# Patient Record
Sex: FEMALE | Employment: UNEMPLOYED | URBAN - METROPOLITAN AREA
[De-identification: names, ages, dates, MRNs, and addresses within clinical notes are randomized per-mention and may not be internally consistent; named-entity substitution may affect disease eponyms.]

---

## 2024-07-08 PROBLEM — Z98.891 H/O: C-SECTION: Status: ACTIVE | Noted: 2024-07-08

## 2024-07-20 ENCOUNTER — HOSPITAL ENCOUNTER (EMERGENCY)
Age: 34
Discharge: HOME | End: 2024-07-20
Attending: EMERGENCY MEDICINE
Payer: MEDICAID

## 2024-07-20 VITALS
HEIGHT: 64 IN | DIASTOLIC BLOOD PRESSURE: 80 MMHG | SYSTOLIC BLOOD PRESSURE: 115 MMHG | HEART RATE: 79 BPM | RESPIRATION RATE: 18 BRPM | WEIGHT: 228 LBS | OXYGEN SATURATION: 98 % | TEMPERATURE: 98.8 F | BODY MASS INDEX: 38.93 KG/M2

## 2024-07-20 DIAGNOSIS — O46.90 VAGINAL BLEEDING IN PREGNANCY: Primary | ICD-10-CM

## 2024-07-20 LAB
BACTERIA URNS QL MICRO: ABNORMAL
BILIRUB UR QL STRIP: NEGATIVE
CASTS #/AREA URNS LPF: ABNORMAL /LPF
CLARITY UR: ABNORMAL
COLOR UR: YELLOW
EPI CELLS #/AREA URNS HPF: ABNORMAL /HPF
GLUCOSE UR STRIP-MCNC: NEGATIVE MG/DL
HGB UR QL STRIP.AUTO: ABNORMAL
KETONES UR STRIP-MCNC: NEGATIVE MG/DL
LEUKOCYTE ESTERASE UR QL STRIP: ABNORMAL
NITRITE UR QL STRIP: NEGATIVE
PH UR STRIP: 8 [PH] (ref 5–8)
PROT UR STRIP-MCNC: NEGATIVE MG/DL
RBC #/AREA URNS HPF: ABNORMAL /HPF
SP GR UR STRIP: 1.02 (ref 1–1.03)
UROBILINOGEN UR STRIP-ACNC: NORMAL EU/DL (ref 0–1)
WBC #/AREA URNS HPF: ABNORMAL /HPF

## 2024-07-20 PROCEDURE — 81001 URINALYSIS AUTO W/SCOPE: CPT

## 2024-07-20 PROCEDURE — 99283 EMERGENCY DEPT VISIT LOW MDM: CPT

## 2024-07-20 ASSESSMENT — ENCOUNTER SYMPTOMS
SORE THROAT: 0
FACIAL SWELLING: 0
RHINORRHEA: 0
CONSTIPATION: 0
CHEST TIGHTNESS: 0
BLOOD IN STOOL: 0
SINUS PRESSURE: 0
EYE REDNESS: 0
COUGH: 0
BACK PAIN: 0
VOMITING: 0
TROUBLE SWALLOWING: 0
ABDOMINAL PAIN: 0
EYE PAIN: 0
SHORTNESS OF BREATH: 0
DIARRHEA: 0
NAUSEA: 0
EYE DISCHARGE: 0
WHEEZING: 0
COLOR CHANGE: 0

## 2024-07-20 ASSESSMENT — LIFESTYLE VARIABLES
HOW OFTEN DO YOU HAVE A DRINK CONTAINING ALCOHOL: NEVER
HOW MANY STANDARD DRINKS CONTAINING ALCOHOL DO YOU HAVE ON A TYPICAL DAY: PATIENT DOES NOT DRINK

## 2024-07-20 NOTE — ED PROVIDER NOTES
eMERGENCY dEPARTMENT eNCOUnter      Pt Name: Danni Cole  MRN: [id number]  Birthdate 1990  Date of evaluation: 7/20/24      CHIEF COMPLAINT       Chief Complaint   Patient presents with    Vaginal Bleeding         HISTORY OF PRESENT ILLNESS    Danni Cole is a 33 y.o. female who presents complaining of spotting.  Patient states she is about 13 weeks pregnant based on her last menstrual period.  Patient is done a first look at the pregnancy center but has not seen her actual doctor until this upcoming week.  Patient states she is been a little bit more active this past week because before that she has been down quite a bit with a lot of nausea.  Patient states that she has no abdominal pain no recent intercourse.  Patient states she is not having any dysuria or hematuria.  Patient states that today she woke up and noticed spotting was on her underwear and then when she wiped there was still some that lightened up.  Patient has not started to feel the baby move yet.  Patient has had 1 prior pregnancy with no complications and a live birth.  Patient has not otherwise been ill.      REVIEW OF SYSTEMS       Review of Systems   Constitutional:  Negative for activity change, appetite change, chills, diaphoresis and fever.   HENT:  Negative for congestion, ear pain, facial swelling, nosebleeds, rhinorrhea, sinus pressure, sore throat and trouble swallowing.    Eyes:  Negative for pain, discharge and redness.   Respiratory:  Negative for cough, chest tightness, shortness of breath and wheezing.    Cardiovascular:  Negative for chest pain, palpitations and leg swelling.   Gastrointestinal:  Negative for abdominal pain, blood in stool, constipation, diarrhea, nausea and vomiting.   Genitourinary:  Positive for vaginal bleeding. Negative for difficulty urinating, dysuria, flank pain, frequency, genital sores and hematuria.   Musculoskeletal:  Negative for arthralgias, back pain, gait problem, joint swelling, myalgias

## 2024-07-20 NOTE — ED NOTES
Mode of arrival (squad #, walk in, police, etc) : walk in        Chief complaint(s): spotting        Arrival Note (brief scenario, treatment PTA, etc).: Pt reports to the ED with the complaint of spotting. Pt states she has been mostly in bedrest because of how severe her nausea has been. She states the last couple days she has been up around more and trying to clean and be more active. Pt woke up today with some brown spotting that has not stopped but has not worsened. Pt denies any cramping and has not had intercourse in weeks due to her nausea. Pt is drinking lots of water and able to keep food down just feels nausea after. Pt stated she had a lot of issues with BV during her last pregnancy so she has abstained from sex and been drinking more water.         C= \"Have you ever felt that you should Cut down on your drinking?\"  No  A= \"Have people Annoyed you by criticizing your drinking?\"  No  G= \"Have you ever felt bad or Guilty about your drinking?\"  No  E= \"Have you ever had a drink as an Eye-opener first thing in the morning to steady your nerves or to help a hangover?\"  No      Deferred []      Reason for deferring: N/A    *If yes to two or more: probable alcohol abuse.*

## 2024-07-20 NOTE — ED NOTES
Pt offered testing for BV if that is what she is concerned about, pt denies and states she will follow up with the OBGYN on Tuesday during her appointment. Pt just wanted to make sure baby was okay and bedside ultrasound showed a intrauterine pregnancy and baby did have a heartbeat at this time per Dr. Clancy.

## 2024-07-23 ENCOUNTER — HOSPITAL ENCOUNTER (OUTPATIENT)
Age: 34
Discharge: HOME | End: 2024-07-23
Payer: MEDICAID

## 2024-07-23 ENCOUNTER — INITIAL PRENATAL (OUTPATIENT)
Dept: OBGYN CLINIC | Age: 34
End: 2024-07-23
Payer: MEDICAID

## 2024-07-23 DIAGNOSIS — Z36.9 1ST TRIMESTER SCREENING: ICD-10-CM

## 2024-07-23 DIAGNOSIS — Z3A.13 13 WEEKS GESTATION OF PREGNANCY: Primary | ICD-10-CM

## 2024-07-23 DIAGNOSIS — Z34.90 EARLY STAGE OF PREGNANCY: Primary | ICD-10-CM

## 2024-07-23 DIAGNOSIS — Z3A.13 13 WEEKS GESTATION OF PREGNANCY: ICD-10-CM

## 2024-07-23 DIAGNOSIS — Z34.90 EARLY STAGE OF PREGNANCY: ICD-10-CM

## 2024-07-23 LAB
ABO + RH BLD: NORMAL
BACTERIA URNS QL MICRO: ABNORMAL
BASOPHILS # BLD: 0 K/UL (ref 0–0.2)
BASOPHILS NFR BLD: 0 % (ref 0–2)
BILIRUB UR QL STRIP: NEGATIVE
BLOOD BANK COMMENT: NORMAL
BLOOD GROUP ANTIBODIES SERPL: NEGATIVE
CASTS #/AREA URNS LPF: ABNORMAL /LPF
CLARITY UR: ABNORMAL
COLOR UR: YELLOW
EOSINOPHIL # BLD: 0.1 K/UL (ref 0–0.4)
EOSINOPHILS RELATIVE PERCENT: 1 % (ref 0–4)
EPI CELLS #/AREA URNS HPF: ABNORMAL /HPF
ERYTHROCYTE [DISTWIDTH] IN BLOOD BY AUTOMATED COUNT: 13.7 % (ref 11.5–14.9)
GLUCOSE SERPL-MCNC: 87 MG/DL (ref 70–99)
GLUCOSE UR STRIP-MCNC: NEGATIVE MG/DL
HBV SURFACE AG SERPL QL IA: NONREACTIVE
HCT VFR BLD AUTO: 38.6 % (ref 36–46)
HCV AB SERPL QL IA: NONREACTIVE
HGB BLD-MCNC: 12.7 G/DL (ref 12–16)
HGB UR QL STRIP.AUTO: ABNORMAL
HIV 1+2 AB+HIV1 P24 AG SERPL QL IA: NONREACTIVE
KETONES UR STRIP-MCNC: NEGATIVE MG/DL
LEUKOCYTE ESTERASE UR QL STRIP: ABNORMAL
LYMPHOCYTES NFR BLD: 1.3 K/UL (ref 1–4.8)
LYMPHOCYTES RELATIVE PERCENT: 16 % (ref 24–44)
MCH RBC QN AUTO: 30.9 PG (ref 26–34)
MCHC RBC AUTO-ENTMCNC: 33 G/DL (ref 31–37)
MCV RBC AUTO: 93.5 FL (ref 80–100)
MONOCYTES NFR BLD: 0.4 K/UL (ref 0.1–1.3)
MONOCYTES NFR BLD: 5 % (ref 1–7)
NEUTROPHILS NFR BLD: 78 % (ref 36–66)
NEUTS SEG NFR BLD: 6.5 K/UL (ref 1.3–9.1)
NITRITE UR QL STRIP: NEGATIVE
PH UR STRIP: 5.5 [PH] (ref 5–8)
PLATELET # BLD AUTO: 222 K/UL (ref 150–450)
PMV BLD AUTO: 8 FL (ref 6–12)
PROT UR STRIP-MCNC: NEGATIVE MG/DL
RBC # BLD AUTO: 4.13 M/UL (ref 4–5.2)
RBC #/AREA URNS HPF: ABNORMAL /HPF
RUBV IGG SERPL QL IA: 98.9 IU/ML
SP GR UR STRIP: 1.02 (ref 1–1.03)
T PALLIDUM AB SER QL IA: NONREACTIVE
TSH SERPL DL<=0.05 MIU/L-ACNC: 1.34 UIU/ML (ref 0.3–5)
UROBILINOGEN UR STRIP-ACNC: NORMAL EU/DL (ref 0–1)
WBC #/AREA URNS HPF: ABNORMAL /HPF
WBC OTHER # BLD: 8.3 K/UL (ref 3.5–11)

## 2024-07-23 PROCEDURE — 87340 HEPATITIS B SURFACE AG IA: CPT

## 2024-07-23 PROCEDURE — 86901 BLOOD TYPING SEROLOGIC RH(D): CPT

## 2024-07-23 PROCEDURE — H1000 PRENATAL CARE ATRISK ASSESSM: HCPCS | Performed by: NURSE PRACTITIONER

## 2024-07-23 PROCEDURE — 86762 RUBELLA ANTIBODY: CPT

## 2024-07-23 PROCEDURE — 86803 HEPATITIS C AB TEST: CPT

## 2024-07-23 PROCEDURE — 86850 RBC ANTIBODY SCREEN: CPT

## 2024-07-23 PROCEDURE — 87389 HIV-1 AG W/HIV-1&-2 AB AG IA: CPT

## 2024-07-23 PROCEDURE — 87086 URINE CULTURE/COLONY COUNT: CPT

## 2024-07-23 PROCEDURE — 82947 ASSAY GLUCOSE BLOOD QUANT: CPT

## 2024-07-23 PROCEDURE — 81220 CFTR GENE COM VARIANTS: CPT

## 2024-07-23 PROCEDURE — 36415 COLL VENOUS BLD VENIPUNCTURE: CPT

## 2024-07-23 PROCEDURE — 81001 URINALYSIS AUTO W/SCOPE: CPT

## 2024-07-23 PROCEDURE — 86780 TREPONEMA PALLIDUM: CPT

## 2024-07-23 PROCEDURE — 84443 ASSAY THYROID STIM HORMONE: CPT

## 2024-07-23 PROCEDURE — 86900 BLOOD TYPING SEROLOGIC ABO: CPT

## 2024-07-23 PROCEDURE — NBSRV NON-BILLABLE SERVICE: Performed by: NURSE PRACTITIONER

## 2024-07-23 PROCEDURE — 85025 COMPLETE CBC W/AUTO DIFF WBC: CPT

## 2024-07-23 NOTE — PROGRESS NOTES
Patient presents to office for OB intake, nurse visit only. Intake completed following ultrasound in office to confirm pregnancy dating/viability. Based on ultrasound, patient is currently 13w3d  gestation, Estimated Date of Delivery: 1/25/25. Patient presents to intake FOB. This was an unplanned pregnancy. Patient currently taking has a current medication list which includes the following prescription(s): diphenhydramine hcl (sleep), doxylamine succinate (sleep), and pyridoxine.. Patient's medical, surgical, obstetrical and family history reviewed. See HER for details. Patient prenatal lab work given to patient at this visit as well as OB education material. New OB consent forms signed. Patient accepted first trimester screening. Cystic Fibrosis screening ordered . Referral placed to [facility] for first trimester screen. Patient scheduled for follow up OB appointment with Karolina Miguel CNP 8/13/24. Patient instructed to complete lab work prior to follow up OB appointment.

## 2024-07-24 ENCOUNTER — TELEPHONE (OUTPATIENT)
Dept: OBGYN CLINIC | Age: 34
End: 2024-07-24

## 2024-07-24 LAB
MICROORGANISM SPEC CULT: NORMAL
SPECIMEN DESCRIPTION: NORMAL

## 2024-07-24 RX ORDER — CEPHALEXIN 500 MG/1
500 CAPSULE ORAL 4 TIMES DAILY
Qty: 28 CAPSULE | Refills: 0 | Status: SHIPPED | OUTPATIENT
Start: 2024-07-24 | End: 2024-07-31

## 2024-07-24 NOTE — TELEPHONE ENCOUNTER
----- Message from QUE Dickerson - CNP sent at 7/24/2024  7:55 AM EDT -----  Trace of hgb in urine in early pregnancy  Keflex 500mg PO QID x 7 days

## 2024-07-24 NOTE — TELEPHONE ENCOUNTER
Patients  returned call to the office and was notified of results and recommendations, script for keflex to be sent to [facility].

## 2024-07-28 LAB
CFTR ALLELE 1 BLD/T QL: NEGATIVE
CFTR ALLELE 2 BLD/T QL: NEGATIVE
CFTR MUT ANL BLD/T: NORMAL
CFTR MUT ANL BLD/T: NORMAL

## 2024-08-13 ENCOUNTER — HOSPITAL ENCOUNTER (OUTPATIENT)
Age: 34
Discharge: HOME | End: 2024-08-13
Payer: MEDICAID

## 2024-08-13 ENCOUNTER — ROUTINE PRENATAL (OUTPATIENT)
Dept: OBGYN CLINIC | Age: 34
End: 2024-08-13
Payer: MEDICAID

## 2024-08-13 ENCOUNTER — HOSPITAL ENCOUNTER (OUTPATIENT)
Age: 34
Setting detail: SPECIMEN
Discharge: HOME | End: 2024-08-13

## 2024-08-13 VITALS
WEIGHT: 240 LBS | BODY MASS INDEX: 41.2 KG/M2 | HEART RATE: 89 BPM | SYSTOLIC BLOOD PRESSURE: 114 MMHG | DIASTOLIC BLOOD PRESSURE: 78 MMHG

## 2024-08-13 DIAGNOSIS — O09.92 SUPERVISION OF HIGH RISK PREGNANCY IN SECOND TRIMESTER: Primary | ICD-10-CM

## 2024-08-13 DIAGNOSIS — Z98.891 H/O: C-SECTION: ICD-10-CM

## 2024-08-13 DIAGNOSIS — Z3A.16 16 WEEKS GESTATION OF PREGNANCY: ICD-10-CM

## 2024-08-13 DIAGNOSIS — Z3A.16 16 WEEKS GESTATION OF PREGNANCY: Primary | ICD-10-CM

## 2024-08-13 PROCEDURE — 86778 TOXOPLASMA ANTIBODY IGM: CPT

## 2024-08-13 PROCEDURE — 81511 FTL CGEN ABNOR FOUR ANAL: CPT

## 2024-08-13 PROCEDURE — 36415 COLL VENOUS BLD VENIPUNCTURE: CPT

## 2024-08-13 PROCEDURE — 99214 OFFICE O/P EST MOD 30 MIN: CPT | Performed by: NURSE PRACTITIONER

## 2024-08-13 PROCEDURE — 86777 TOXOPLASMA ANTIBODY: CPT

## 2024-08-13 NOTE — PROGRESS NOTES
Danni Cole  2024    YOB: 1990   Patient's last menstrual period was 2024 (approximate).  16w3d        Primary Care Physician: No primary care provider on file.        CC: Initial Prenatal Visit    Subjective:     Danni Cole is a 34 y.o. female     is being seen today for her first obstetrical visit.  This is a planned pregnancy. She is at 16w3d  Her obstetrical history is significant for h/o .      Relationship with FOB: significant other, living together. Patient does intend to breast feed. Pregnancy history fully reviewed.        Objective:   Blood pressure 114/78, pulse 89, weight 108.9 kg (240 lb), last menstrual period 2024.    OB History    Para Term  AB Living   2 1 1 0 0 1   SAB IAB Ectopic Molar Multiple Live Births   0 0 0 0 0 1      # Outcome Date GA Lbr Dada/2nd Weight Sex Type Anes PTL Lv   2 Current            1 Term 2018 40w0d  3.487 kg (7 lb 11 oz) M CS-LTranv   RORY      Birth Comments: [place]       Past Medical History:   Diagnosis Date    Asthma     Seasonal allergies      Past Surgical History:   Procedure Laterality Date     SECTION       SECTION  2018    NOSE SURGERY      x2    NOSE SURGERY        Social History     Socioeconomic History    Marital status:      Spouse name: Not on file    Number of children: Not on file    Years of education: Not on file    Highest education level: Not on file   Occupational History    Not on file   Tobacco Use    Smoking status: Never     Passive exposure: Never    Smokeless tobacco: Never   Vaping Use    Vaping status: Never Used   Substance and Sexual Activity    Alcohol use: Never    Drug use: Never    Sexual activity: Yes     Partners: Male   Other Topics Concern    Not on file   Social History Narrative    ** Merged History Encounter **          Social Determinants of Health     Financial Resource Strain: Not on file   Food

## 2024-08-14 LAB
CANDIDA SPECIES: NEGATIVE
GARDNERELLA VAGINALIS: NEGATIVE
SOURCE: NORMAL
T GONDII IGG SER-ACNC: 1.5 IU/ML
T GONDII IGM SER-ACNC: <0.1 INDEX (ref 0–0.9)
TRICHOMONAS: NEGATIVE

## 2024-08-15 LAB
HPV I/H RISK 4 DNA CVX QL NAA+PROBE: NOT DETECTED
HPV SAMPLE: NORMAL
HPV, INTERPRETATION: NORMAL
HPV16 DNA CVX QL NAA+PROBE: NOT DETECTED
HPV18 DNA CVX QL NAA+PROBE: NOT DETECTED
SPECIMEN DESCRIPTION: NORMAL

## 2024-08-16 LAB
C TRACH DNA SPEC QL PROBE+SIG AMP: NEGATIVE
MICROORGANISM SPEC CULT: NORMAL
N GONORRHOEA DNA SPEC QL PROBE+SIG AMP: NEGATIVE
SERVICE CMNT-IMP: NORMAL
SPECIMEN DESCRIPTION: NORMAL
SPECIMEN DESCRIPTION: NORMAL

## 2024-08-17 LAB
AFP INTERPRETATION: NORMAL
AFP MOM: 1.18
AFP QUAD INTERPRETATION: NORMAL
AFP SPECIMEN: NORMAL
AFP: 31 NG/ML
DATE OF BIRTH: NORMAL
DATING METHOD: NORMAL
DETERMINED BY: NORMAL
DIABETIC: NO
DIMERIC INHIBIN A: 139 PG/ML
DONOR EGG?: NO
DUE DATE: NORMAL
ESTIMATED DUE DATE: NORMAL
FAMILY HISTORY NTD: NO
GESTATIONAL AGE: NORMAL
HX OF HEREDITARY DISORDERS: NO
IN VITRO FERTILIZATION: NO
INHIBIN A MOM: 1.05
INSULIN REQ DIABETES: NO
LAST MENSTRUAL PERIOD: NORMAL
MATERNAL AGE AT EDD: 34.5 YR
MATERNAL WEIGHT: 240
MOM FOR HCG, 2ND TRIMESTER: 1.57
MONOCHORIONIC TWINS: NO
NUMBER OF FETUSES: NORMAL
PATIENT WEIGHT UNITS: NORMAL
PATIENT WEIGHT: NORMAL
PATIENT'S HCG, TRI 2: NORMAL IU/L
PREV TRISOMY PREG: NO
RACE (MATERNAL): NORMAL
RACE: NORMAL
REPEAT SPECIMEN?: NO
SMOKING: NO
SMOKING: NO
UE3 MOM: 0.96
UE3 VALUE: 1.27 NG/ML
VALPROIC/CARBAMAZEP: NO
ZZ NTE CLEAN UP: HISTORY: NO

## 2024-08-22 LAB — CYTOLOGY REPORT: NORMAL

## 2024-09-11 SDOH — ECONOMIC STABILITY: FOOD INSECURITY: WITHIN THE PAST 12 MONTHS, THE FOOD YOU BOUGHT JUST DIDN'T LAST AND YOU DIDN'T HAVE MONEY TO GET MORE.: PATIENT DECLINED

## 2024-09-11 SDOH — ECONOMIC STABILITY: TRANSPORTATION INSECURITY
IN THE PAST 12 MONTHS, HAS LACK OF TRANSPORTATION KEPT YOU FROM MEETINGS, WORK, OR FROM GETTING THINGS NEEDED FOR DAILY LIVING?: PATIENT DECLINED

## 2024-09-11 SDOH — ECONOMIC STABILITY: INCOME INSECURITY: HOW HARD IS IT FOR YOU TO PAY FOR THE VERY BASICS LIKE FOOD, HOUSING, MEDICAL CARE, AND HEATING?: PATIENT DECLINED

## 2024-09-11 SDOH — ECONOMIC STABILITY: FOOD INSECURITY: WITHIN THE PAST 12 MONTHS, YOU WORRIED THAT YOUR FOOD WOULD RUN OUT BEFORE YOU GOT MONEY TO BUY MORE.: PATIENT DECLINED

## 2024-09-12 ENCOUNTER — ROUTINE PRENATAL (OUTPATIENT)
Dept: OBGYN CLINIC | Age: 34
End: 2024-09-12
Payer: MEDICAID

## 2024-09-12 ENCOUNTER — HOSPITAL ENCOUNTER (OUTPATIENT)
Age: 34
Discharge: HOME | End: 2024-09-12
Payer: MEDICAID

## 2024-09-12 ENCOUNTER — ROUTINE PRENATAL (OUTPATIENT)
Dept: PERINATAL CARE | Age: 34
End: 2024-09-12

## 2024-09-12 VITALS
TEMPERATURE: 98.2 F | RESPIRATION RATE: 16 BRPM | HEART RATE: 90 BPM | SYSTOLIC BLOOD PRESSURE: 124 MMHG | BODY MASS INDEX: 43.36 KG/M2 | DIASTOLIC BLOOD PRESSURE: 78 MMHG | HEIGHT: 64 IN | WEIGHT: 254 LBS

## 2024-09-12 VITALS
SYSTOLIC BLOOD PRESSURE: 104 MMHG | HEART RATE: 97 BPM | WEIGHT: 252 LBS | DIASTOLIC BLOOD PRESSURE: 60 MMHG | BODY MASS INDEX: 43.26 KG/M2

## 2024-09-12 DIAGNOSIS — O44.22 PLACENTA MARGINALIS IN SECOND TRIMESTER: ICD-10-CM

## 2024-09-12 DIAGNOSIS — O35.BXX0 FETAL VENTRICULAR SEPTAL DEFECT AFFECTING ANTEPARTUM CARE OF MOTHER, SINGLE OR UNSPECIFIED FETUS: Primary | ICD-10-CM

## 2024-09-12 DIAGNOSIS — O35.BXX0 FETAL CARDIAC ECHOGENIC FOCUS, ANTEPARTUM, SINGLE OR UNSPECIFIED FETUS: ICD-10-CM

## 2024-09-12 DIAGNOSIS — Z3A.20 20 WEEKS GESTATION OF PREGNANCY: ICD-10-CM

## 2024-09-12 DIAGNOSIS — O43.199 MARGINAL INSERTION OF UMBILICAL CORD AFFECTING MANAGEMENT OF MOTHER: ICD-10-CM

## 2024-09-12 DIAGNOSIS — O99.212 OBESITY AFFECTING PREGNANCY IN SECOND TRIMESTER, UNSPECIFIED OBESITY TYPE: ICD-10-CM

## 2024-09-12 DIAGNOSIS — O35.BXX0 FETAL VENTRICULAR SEPTAL DEFECT AFFECTING ANTEPARTUM CARE OF MOTHER, SINGLE OR UNSPECIFIED FETUS: ICD-10-CM

## 2024-09-12 DIAGNOSIS — Z36.86 ENCOUNTER FOR SCREENING FOR RISK OF PRE-TERM LABOR: ICD-10-CM

## 2024-09-12 DIAGNOSIS — Z98.891 H/O: C-SECTION: Primary | ICD-10-CM

## 2024-09-12 LAB
SEND OUT REPORT: NORMAL
TEST NAME: NORMAL

## 2024-09-12 PROCEDURE — 36415 COLL VENOUS BLD VENIPUNCTURE: CPT

## 2024-09-12 PROCEDURE — 99214 OFFICE O/P EST MOD 30 MIN: CPT | Performed by: OBSTETRICS & GYNECOLOGY

## 2024-09-16 PROBLEM — O28.3 ABNORMAL FETAL ULTRASOUND: Status: ACTIVE | Noted: 2024-09-16

## 2024-09-16 PROBLEM — O43.199 MARGINAL INSERTION OF UMBILICAL CORD AFFECTING MANAGEMENT OF MOTHER: Status: ACTIVE | Noted: 2024-09-16

## 2024-09-30 ENCOUNTER — TELEPHONE (OUTPATIENT)
Dept: PERINATAL CARE | Age: 34
End: 2024-09-30

## 2024-09-30 DIAGNOSIS — R89.9 ABNORMAL LABORATORY TEST RESULT: Primary | ICD-10-CM

## 2024-09-30 NOTE — TELEPHONE ENCOUNTER
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

## 2024-10-10 ENCOUNTER — ROUTINE PRENATAL (OUTPATIENT)
Dept: OBGYN CLINIC | Age: 34
End: 2024-10-10
Payer: MEDICAID

## 2024-10-10 VITALS
DIASTOLIC BLOOD PRESSURE: 68 MMHG | SYSTOLIC BLOOD PRESSURE: 110 MMHG | HEART RATE: 80 BPM | BODY MASS INDEX: 44.11 KG/M2 | WEIGHT: 257 LBS

## 2024-10-10 DIAGNOSIS — Z98.891 H/O: C-SECTION: ICD-10-CM

## 2024-10-10 DIAGNOSIS — O43.199 MARGINAL INSERTION OF UMBILICAL CORD AFFECTING MANAGEMENT OF MOTHER: ICD-10-CM

## 2024-10-10 DIAGNOSIS — O28.3 ABNORMAL FETAL ULTRASOUND: ICD-10-CM

## 2024-10-10 DIAGNOSIS — Z3A.24 24 WEEKS GESTATION OF PREGNANCY: ICD-10-CM

## 2024-10-10 DIAGNOSIS — O09.92 HIGH-RISK PREGNANCY IN SECOND TRIMESTER: Primary | ICD-10-CM

## 2024-10-10 PROCEDURE — 99213 OFFICE O/P EST LOW 20 MIN: CPT | Performed by: NURSE PRACTITIONER

## 2024-10-10 NOTE — PROGRESS NOTES
Danni Cole is a 34 y.o. female 24w5d        OB History    Para Term  AB Living   2 1 1 0 0 1   SAB IAB Ectopic Molar Multiple Live Births   0 0 0 0   1      # Outcome Date GA Lbr Dada/2nd Weight Sex Type Anes PTL Lv   2 Current            1 Term 2018 40w0d  3.487 kg (7 lb 11 oz) M CS-LTranv   RORY      Birth Comments: [place]       Vitals  BP: 110/68  Weight - Scale: 116.6 kg (257 lb)  Pulse: 80  Patient Position: Sitting    The patient was seen and evaluated. There was positive fetal movements. No contractions or leakage of fluid. Signs and symptoms of  labor as well as labor were reviewed.The Nuchal Translucency testing was reviewed and found to be LPC A single marker MSAFP was reviewed and found to be normal. The patients anatomy ultrasound has been completed and reviewed with patient. TOP  OH Reviewed. A 28 week lab panel was ordered. This includes a (HH, 1 hr GTT, U/A C&S). The patient is to complete this in the next two to four weeks.    The following was discussed:  A. Counseling on the incidents of birth defects in the general population being 2-4% and that ultrasound does not diagnose every form of congenital abnormality and has limitations .   B. The only way to evaluate the chromosomal makeup to near 100% certainty is with invasive testing such as chorionic villous sampling or amniocentesis.   C. The options for testing listed in (B) with detail and all risks/benefits and alternatives will be discussed in the high risk setting.   D. NIPT testing options will be discussed with the patient, taking a maternal blood sample and screening it for fetal cells then performing a genetic karyotype.  If this were to be positive for                    a trisomy then a confirmatory amniocentesis or [facility] for confirmation would be needed.    E. TOPSTOH guidelines will be reviewed with the patient.      The S/S of Pre-Eclampsia were reviewed with the patient in detail. She is to

## 2024-10-24 ENCOUNTER — ROUTINE PRENATAL (OUTPATIENT)
Dept: PERINATAL CARE | Age: 34
End: 2024-10-24
Payer: MEDICAID

## 2024-10-24 VITALS
SYSTOLIC BLOOD PRESSURE: 133 MMHG | WEIGHT: 266 LBS | BODY MASS INDEX: 45.41 KG/M2 | RESPIRATION RATE: 16 BRPM | HEIGHT: 64 IN | HEART RATE: 101 BPM | DIASTOLIC BLOOD PRESSURE: 82 MMHG | TEMPERATURE: 98 F

## 2024-10-24 DIAGNOSIS — O44.22 PLACENTA MARGINALIS IN SECOND TRIMESTER: ICD-10-CM

## 2024-10-24 DIAGNOSIS — Z36.4 ULTRASOUND FOR ANTENATAL SCREENING FOR FETAL GROWTH RESTRICTION: ICD-10-CM

## 2024-10-24 DIAGNOSIS — O35.BXX0 FETAL CARDIAC ECHOGENIC FOCUS, ANTEPARTUM, SINGLE OR UNSPECIFIED FETUS: ICD-10-CM

## 2024-10-24 DIAGNOSIS — Z3A.26 26 WEEKS GESTATION OF PREGNANCY: ICD-10-CM

## 2024-10-24 DIAGNOSIS — O35.BXX0 FETAL VENTRICULAR SEPTAL DEFECT AFFECTING ANTEPARTUM CARE OF MOTHER, SINGLE OR UNSPECIFIED FETUS: Primary | ICD-10-CM

## 2024-10-24 DIAGNOSIS — O99.212 OBESITY AFFECTING PREGNANCY IN SECOND TRIMESTER, UNSPECIFIED OBESITY TYPE: ICD-10-CM

## 2024-10-24 PROCEDURE — 76820 UMBILICAL ARTERY ECHO: CPT | Performed by: OBSTETRICS & GYNECOLOGY

## 2024-10-24 PROCEDURE — 76825 ECHO EXAM OF FETAL HEART: CPT | Performed by: OBSTETRICS & GYNECOLOGY

## 2024-10-24 PROCEDURE — 76816 OB US FOLLOW-UP PER FETUS: CPT | Performed by: OBSTETRICS & GYNECOLOGY

## 2024-10-24 PROCEDURE — 76827 ECHO EXAM OF FETAL HEART: CPT | Performed by: OBSTETRICS & GYNECOLOGY

## 2024-10-24 PROCEDURE — 93325 DOPPLER ECHO COLOR FLOW MAPG: CPT | Performed by: OBSTETRICS & GYNECOLOGY

## 2024-10-29 ENCOUNTER — HOSPITAL ENCOUNTER (OUTPATIENT)
Age: 34
Discharge: HOME | End: 2024-10-29
Payer: MEDICAID

## 2024-10-29 DIAGNOSIS — Z3A.24 24 WEEKS GESTATION OF PREGNANCY: ICD-10-CM

## 2024-10-29 DIAGNOSIS — O09.92 HIGH-RISK PREGNANCY IN SECOND TRIMESTER: ICD-10-CM

## 2024-10-29 LAB
BACTERIA URNS QL MICRO: ABNORMAL
BASOPHILS # BLD: 0.1 K/UL (ref 0–0.2)
BASOPHILS NFR BLD: 1 % (ref 0–2)
BILIRUB UR QL STRIP: NEGATIVE
CASTS #/AREA URNS LPF: ABNORMAL /LPF
CLARITY UR: ABNORMAL
COLOR UR: YELLOW
EOSINOPHIL # BLD: 0.1 K/UL (ref 0–0.4)
EOSINOPHILS RELATIVE PERCENT: 1 % (ref 0–4)
EPI CELLS #/AREA URNS HPF: ABNORMAL /HPF
ERYTHROCYTE [DISTWIDTH] IN BLOOD BY AUTOMATED COUNT: 13.4 % (ref 11.5–14.9)
GLUCOSE UR STRIP-MCNC: NEGATIVE MG/DL
HCT VFR BLD AUTO: 35.5 % (ref 36–46)
HGB BLD-MCNC: 12.4 G/DL (ref 12–16)
HGB UR QL STRIP.AUTO: NEGATIVE
KETONES UR STRIP-MCNC: NEGATIVE MG/DL
LEUKOCYTE ESTERASE UR QL STRIP: ABNORMAL
LYMPHOCYTES NFR BLD: 1.6 K/UL (ref 1–4.8)
LYMPHOCYTES RELATIVE PERCENT: 14 % (ref 24–44)
MCH RBC QN AUTO: 32.4 PG (ref 26–34)
MCHC RBC AUTO-ENTMCNC: 35 G/DL (ref 31–37)
MCV RBC AUTO: 92.4 FL (ref 80–100)
MONOCYTES NFR BLD: 0.6 K/UL (ref 0.1–1.3)
MONOCYTES NFR BLD: 5 % (ref 1–7)
NEUTROPHILS NFR BLD: 79 % (ref 36–66)
NEUTS SEG NFR BLD: 9.1 K/UL (ref 1.3–9.1)
NITRITE UR QL STRIP: NEGATIVE
PH UR STRIP: 6 [PH] (ref 5–8)
PLATELET # BLD AUTO: 241 K/UL (ref 150–450)
PMV BLD AUTO: 7.5 FL (ref 6–12)
PROT UR STRIP-MCNC: NEGATIVE MG/DL
RBC # BLD AUTO: 3.84 M/UL (ref 4–5.2)
RBC #/AREA URNS HPF: ABNORMAL /HPF
SP GR UR STRIP: 1.03 (ref 1–1.03)
UROBILINOGEN UR STRIP-ACNC: NORMAL EU/DL (ref 0–1)
WBC #/AREA URNS HPF: ABNORMAL /HPF
WBC OTHER # BLD: 11.4 K/UL (ref 3.5–11)

## 2024-10-29 PROCEDURE — 85025 COMPLETE CBC W/AUTO DIFF WBC: CPT

## 2024-10-29 PROCEDURE — 81001 URINALYSIS AUTO W/SCOPE: CPT

## 2024-10-29 PROCEDURE — 87086 URINE CULTURE/COLONY COUNT: CPT

## 2024-10-29 PROCEDURE — 36415 COLL VENOUS BLD VENIPUNCTURE: CPT

## 2024-10-30 LAB
MICROORGANISM SPEC CULT: NORMAL
SPECIMEN DESCRIPTION: NORMAL

## 2024-11-07 ENCOUNTER — ROUTINE PRENATAL (OUTPATIENT)
Dept: OBGYN CLINIC | Age: 34
End: 2024-11-07
Payer: MEDICAID

## 2024-11-07 VITALS
BODY MASS INDEX: 46.69 KG/M2 | WEIGHT: 272 LBS | HEART RATE: 80 BPM | DIASTOLIC BLOOD PRESSURE: 74 MMHG | SYSTOLIC BLOOD PRESSURE: 110 MMHG

## 2024-11-07 DIAGNOSIS — Z98.891 H/O: C-SECTION: ICD-10-CM

## 2024-11-07 DIAGNOSIS — Z3A.28 28 WEEKS GESTATION OF PREGNANCY: ICD-10-CM

## 2024-11-07 DIAGNOSIS — O43.199 MARGINAL INSERTION OF UMBILICAL CORD AFFECTING MANAGEMENT OF MOTHER: ICD-10-CM

## 2024-11-07 DIAGNOSIS — O28.3 ABNORMAL FETAL ULTRASOUND: ICD-10-CM

## 2024-11-07 DIAGNOSIS — O09.92 HIGH-RISK PREGNANCY IN SECOND TRIMESTER: Primary | ICD-10-CM

## 2024-11-07 PROCEDURE — 99213 OFFICE O/P EST LOW 20 MIN: CPT | Performed by: NURSE PRACTITIONER

## 2024-11-07 NOTE — PROGRESS NOTES
Absolute 10/29/2024 0.10  0.0 - 0.2 k/uL Final    Color, UA 10/29/2024 YELLOW  Yellow Final    Turbidity UA 10/29/2024 CLOUDY  Clear Final    Glucose, Ur 10/29/2024 NEGATIVE  NEGATIVE mg/dL Final    Bilirubin, Urine 10/29/2024 NEGATIVE  NEGATIVE Final    Ketones, Urine 10/29/2024 NEGATIVE  NEGATIVE mg/dL Final    Specific Gravity, UA 10/29/2024 1.030  1.000 - 1.030 Final    Urine Hgb 10/29/2024 NEGATIVE  NEGATIVE Final    pH, Urine 10/29/2024 6.0  5.0 - 8.0 Final    Protein, UA 10/29/2024 NEGATIVE  NEGATIVE mg/dL Final    Urobilinogen, Urine 10/29/2024 Normal  0.0 - 1.0 EU/dL Final    Nitrite, Urine 10/29/2024 NEGATIVE  NEGATIVE Final    Leukocyte Esterase, Urine 10/29/2024 SMALL (A)  NEGATIVE Final    WBC, UA 10/29/2024 10 TO 20 (A)  0 TO 5 /HPF Final    RBC, UA 10/29/2024 3 to 5 (A)  0 TO 2 /HPF Final    Casts UA 10/29/2024 0 TO 2 (A)  None /LPF Final    Epithelial Cells, UA 10/29/2024 6 TO 9  /HPF Final    Bacteria, UA 10/29/2024 MODERATE (A)  None Final    Specimen Description 10/29/2024 .CLEAN CATCH URINE   Final    Culture 10/29/2024 NO SIGNIFICANT GROWTH   Final   ]    24 Release for  op report faxed-[place]    24  PRAF done    ACE by TV[facility]:     High Risk Dx:  Previous  no dilation past 5 cm. Labored for 30 hours. Wishes TOLAC. Risks reviewed. OP report pending    PRENATAL LAB RESULTS:   Pre-pregnancy: BMI  Blood Type/Rh: O POS  Antibody Screen: NEG  Hemoglobin, Hematocrit: 12.7/38.6  Rubella: REACTIVE  T. Pallidum, IgG: NR  Hepatitis B Surface Antigen: NR  TSH: 1.34  HIV: NR  Sickle Cell Screen:   Gonorrhea: NEG  Chlamydia: NEG  Urine culture: NEG    Early  hour Glucose Tolerance Test:     28 wk 1 hr GTT    Glucose Fasting: **  1 hour Glucose Tolerance Test: **  2 hour Glucose Tolerance Test: **  3 hour Glucose Tolerance Test: **    Group B Strep:      Cystic Fibrosis Screen: NEG    First Trimester Screen:  Late PNC  Quad Screen: NEG  Anatomy US: 24    Tdap: declined

## 2024-11-08 ENCOUNTER — TELEPHONE (OUTPATIENT)
Dept: OBGYN CLINIC | Age: 34
End: 2024-11-08

## 2024-11-08 NOTE — TELEPHONE ENCOUNTER
Returned pt call re: anyi.  PA not required.  Pt to contact DME to send the office an order.  Pt verbalized understanding.

## 2024-11-08 NOTE — TELEPHONE ENCOUNTER
Pt called requesting rx for breast pump- however per insurance our office is required  to contact [phone] provider line - to complete PA    [phone] please contact pt when completed so she may follow up with insurance

## 2024-11-21 ENCOUNTER — ROUTINE PRENATAL (OUTPATIENT)
Dept: OBGYN CLINIC | Age: 34
End: 2024-11-21
Payer: MEDICAID

## 2024-11-21 VITALS
WEIGHT: 274 LBS | BODY MASS INDEX: 47.03 KG/M2 | DIASTOLIC BLOOD PRESSURE: 72 MMHG | HEART RATE: 86 BPM | SYSTOLIC BLOOD PRESSURE: 112 MMHG

## 2024-11-21 DIAGNOSIS — O43.199 MARGINAL INSERTION OF UMBILICAL CORD AFFECTING MANAGEMENT OF MOTHER: ICD-10-CM

## 2024-11-21 DIAGNOSIS — O09.93 HIGH-RISK PREGNANCY IN THIRD TRIMESTER: Primary | ICD-10-CM

## 2024-11-21 DIAGNOSIS — Z3A.30 30 WEEKS GESTATION OF PREGNANCY: ICD-10-CM

## 2024-11-21 DIAGNOSIS — Z98.891 H/O: C-SECTION: ICD-10-CM

## 2024-11-21 DIAGNOSIS — O28.3 ABNORMAL FETAL ULTRASOUND: ICD-10-CM

## 2024-11-21 PROCEDURE — 99213 OFFICE O/P EST LOW 20 MIN: CPT | Performed by: NURSE PRACTITIONER

## 2024-11-21 NOTE — PROGRESS NOTES
.[email or web address]        Danni Cole is a 34 y.o. female 30w5d        OB History    Para Term  AB Living   2 1 1 0 0 1   SAB IAB Ectopic Molar Multiple Live Births   0 0 0 0   1      # Outcome Date GA Lbr Dada/2nd Weight Sex Type Anes PTL Lv   2 Current            1 Term 2018 40w0d  3.487 kg (7 lb 11 oz) M CS-LTranv   RORY      Birth Comments: [place]       Vitals  BP: 112/72  Weight - Scale: 124.3 kg (274 lb)  Pulse: 86  Patient Position: Sitting  Albumin: Negative  Glucose: Negative      The patient was seen and evaluated. There was positive fetal movements. No contractions or leakage of fluid. Signs and symptoms of  labor as well as labor were reviewed. The S/S of Pre-Eclampsia were reviewed with the patient in detail. She is to report any of these if they occur. She currently denies any of these.    The patient had her 28 week labs completed.  Hospital Outpatient Visit on 10/29/2024   Component Date Value Ref Range Status    WBC 10/29/2024 11.4 (H)  3.5 - 11.0 k/uL Final    RBC 10/29/2024 3.84 (L)  4.0 - 5.2 m/uL Final    Hemoglobin 10/29/2024 12.4  12.0 - 16.0 g/dL Final    Hematocrit 10/29/2024 35.5 (L)  36 - 46 % Final    MCV 10/29/2024 92.4  80 - 100 fL Final    MCH 10/29/2024 32.4  26 - 34 pg Final    MCHC 10/29/2024 35.0  31 - 37 g/dL Final    RDW 10/29/2024 13.4  11.5 - 14.9 % Final    Platelets 10/29/2024 241  150 - 450 k/uL Final    MPV 10/29/2024 7.5  6.0 - 12.0 fL Final    Neutrophils % 10/29/2024 79 (H)  36 - 66 % Final    Lymphocytes % 10/29/2024 14 (L)  24 - 44 % Final    Monocytes % 10/29/2024 5  1 - 7 % Final    Eosinophils % 10/29/2024 1  0 - 4 % Final    Basophils % 10/29/2024 1  0 - 2 % Final    Neutrophils Absolute 10/29/2024 9.10  1.3 - 9.1 k/uL Final    Lymphocytes Absolute 10/29/2024 1.60  1.0 - 4.8 k/uL Final    Monocytes Absolute 10/29/2024 0.60  0.1 - 1.3 k/uL Final    Eosinophils Absolute 10/29/2024 0.10  0.0 - 0.4 k/uL Final    Basophils Absolute 10/29/2024 0.10

## 2024-12-05 ENCOUNTER — ROUTINE PRENATAL (OUTPATIENT)
Dept: PERINATAL CARE | Age: 34
End: 2024-12-05

## 2024-12-05 ENCOUNTER — ROUTINE PRENATAL (OUTPATIENT)
Dept: OBGYN CLINIC | Age: 34
End: 2024-12-05
Payer: MEDICAID

## 2024-12-05 VITALS
RESPIRATION RATE: 16 BRPM | HEART RATE: 86 BPM | OXYGEN SATURATION: 98 % | DIASTOLIC BLOOD PRESSURE: 70 MMHG | WEIGHT: 279 LBS | TEMPERATURE: 98.5 F | BODY MASS INDEX: 47.63 KG/M2 | HEIGHT: 64 IN | SYSTOLIC BLOOD PRESSURE: 115 MMHG

## 2024-12-05 VITALS
WEIGHT: 282 LBS | HEART RATE: 81 BPM | BODY MASS INDEX: 48.41 KG/M2 | SYSTOLIC BLOOD PRESSURE: 110 MMHG | DIASTOLIC BLOOD PRESSURE: 73 MMHG

## 2024-12-05 DIAGNOSIS — Z36.4 ULTRASOUND FOR ANTENATAL SCREENING FOR FETAL GROWTH RESTRICTION: ICD-10-CM

## 2024-12-05 DIAGNOSIS — Z36.3 ENCOUNTER FOR ROUTINE SCREENING FOR MALFORMATION USING ULTRASONICS: ICD-10-CM

## 2024-12-05 DIAGNOSIS — Z98.891 H/O: C-SECTION: ICD-10-CM

## 2024-12-05 DIAGNOSIS — O35.BXX0 FETAL CARDIAC ECHOGENIC FOCUS, ANTEPARTUM, SINGLE OR UNSPECIFIED FETUS: ICD-10-CM

## 2024-12-05 DIAGNOSIS — O99.213 OBESITY AFFECTING PREGNANCY IN THIRD TRIMESTER, UNSPECIFIED OBESITY TYPE: ICD-10-CM

## 2024-12-05 DIAGNOSIS — O09.93 HIGH-RISK PREGNANCY IN THIRD TRIMESTER: Primary | ICD-10-CM

## 2024-12-05 DIAGNOSIS — O28.3 ABNORMAL FETAL ULTRASOUND: ICD-10-CM

## 2024-12-05 DIAGNOSIS — O44.23 PLACENTA MARGINALIS IN THIRD TRIMESTER: ICD-10-CM

## 2024-12-05 DIAGNOSIS — O28.5 ABNORMAL GENETIC TEST DURING PREGNANCY: Primary | ICD-10-CM

## 2024-12-05 DIAGNOSIS — Z3A.32 32 WEEKS GESTATION OF PREGNANCY: ICD-10-CM

## 2024-12-05 DIAGNOSIS — O43.199 MARGINAL INSERTION OF UMBILICAL CORD AFFECTING MANAGEMENT OF MOTHER: ICD-10-CM

## 2024-12-05 DIAGNOSIS — O35.BXX0 FETAL VENTRICULAR SEPTAL DEFECT AFFECTING ANTEPARTUM CARE OF MOTHER, SINGLE OR UNSPECIFIED FETUS: ICD-10-CM

## 2024-12-05 PROCEDURE — 99214 OFFICE O/P EST MOD 30 MIN: CPT | Performed by: OBSTETRICS & GYNECOLOGY

## 2024-12-05 PROCEDURE — 59025 FETAL NON-STRESS TEST: CPT | Performed by: OBSTETRICS & GYNECOLOGY

## 2024-12-05 NOTE — PROGRESS NOTES
.MBOBNST    Pt started on NST 1050am.  Pt completed NST 1147am.  
APPOINTMENT WITH  TESTING.    The patient, Danni Cole is a 34 y.o. female, was seen with a total time spent of 30 minutes for the visit on this date of service by the provider for  testing only. The time component had both face to face and non face to face time spent in determining the total time component.  Counseling and education regarding her diagnosis listed below and her options regarding those diagnoses were also included in determining her time component.      Diagnosis Orders   1. High-risk pregnancy in third trimester  FETAL NON-STRESS TEST      2. echogenic focus seen in left ventricle of fetal heart, suspected fetal cardiac ventricular septal defect  FETAL NON-STRESS TEST      3. Marginal insertion of umbilical cord  FETAL NON-STRESS TEST      4. 32 weeks gestation of pregnancy  FETAL NON-STRESS TEST      5. BMI 40.0-44.9, adult  FETAL NON-STRESS TEST      6. H/O:              The patient had her preventative health maintenance recommendations and follow-up reviewed with her at the completion of her visit.        Electronically signed by Ceci Medina DO on 2024 at 12:15 PM     Assessment:  1. aDnni Cole is a 34 y.o. female  2.   3. 32w5d    Patient Active Problem List    Diagnosis Date Noted    BMI 40.0-44.9, adult 2024     Priority: High    Marginal insertion of umbilical cord 2024     Priority: High    echogenic focus seen in left ventricle of fetal heart, suspected fetal cardiac ventricular septal defect 2024     Priority: High    H/O:  2024        Diagnosis Orders   1. High-risk pregnancy in third trimester  FETAL NON-STRESS TEST      2. echogenic focus seen in left ventricle of fetal heart, suspected fetal cardiac ventricular septal defect  FETAL NON-STRESS TEST      3. Marginal insertion of umbilical cord  FETAL NON-STRESS TEST      4. 32 weeks gestation of pregnancy  FETAL NON-STRESS TEST      5. BMI

## 2024-12-05 NOTE — PROGRESS NOTES
Danni ANGELINA Cole, was evaluated through a synchronous (real-time) audio-video encounter. The patient (or guardian if applicable) is aware that this is a billable service, which includes applicable co-pays. This Virtual Visit was conducted with patient's (and/or legal guardian's) consent. Patient identification was verified, and a caregiver was present when appropriate.   The patient was located at Facility ([facility] Department): [address] [address]  Provider was located at Home ([facility] Dept State): [place].  Confirm you are appropriately licensed, registered, or certified to deliver care in the state where the patient is located as indicated above. If you are not or unsure, please re-schedule the visit: Yes, I confirm.      Total time spent for this encounter:  approximately 30 minutes (see dictation).    --Konstantin Mckeon MD on 12/5/2024 at 8:49 AM    An electronic signature was used to authenticate this note.

## 2024-12-10 ENCOUNTER — TELEPHONE (OUTPATIENT)
Dept: OBGYN CLINIC | Age: 34
End: 2024-12-10

## 2024-12-10 NOTE — TELEPHONE ENCOUNTER
Pt called in and would like to speak with someone regarding her MFM appointments and Concerns, she wants to get some clarity about all her appointments ,please call her back

## 2024-12-17 ENCOUNTER — ROUTINE PRENATAL (OUTPATIENT)
Dept: OBGYN CLINIC | Age: 34
End: 2024-12-17
Payer: MEDICAID

## 2024-12-17 VITALS
HEART RATE: 89 BPM | SYSTOLIC BLOOD PRESSURE: 114 MMHG | WEIGHT: 280 LBS | BODY MASS INDEX: 48.06 KG/M2 | DIASTOLIC BLOOD PRESSURE: 76 MMHG

## 2024-12-17 DIAGNOSIS — Z98.891 H/O: C-SECTION: ICD-10-CM

## 2024-12-17 DIAGNOSIS — O43.199 MARGINAL INSERTION OF UMBILICAL CORD AFFECTING MANAGEMENT OF MOTHER: ICD-10-CM

## 2024-12-17 DIAGNOSIS — O28.3 ABNORMAL FETAL ULTRASOUND: ICD-10-CM

## 2024-12-17 DIAGNOSIS — Z3A.34 34 WEEKS GESTATION OF PREGNANCY: ICD-10-CM

## 2024-12-17 DIAGNOSIS — O09.93 HIGH-RISK PREGNANCY IN THIRD TRIMESTER: Primary | ICD-10-CM

## 2024-12-17 PROCEDURE — 59025 FETAL NON-STRESS TEST: CPT | Performed by: NURSE PRACTITIONER

## 2024-12-17 PROCEDURE — 99213 OFFICE O/P EST LOW 20 MIN: CPT | Performed by: NURSE PRACTITIONER

## 2024-12-17 RX ORDER — LANCETS 28 GAUGE
1 EACH MISCELLANEOUS 4 TIMES DAILY
Qty: 120 EACH | Refills: 8 | Status: SHIPPED | OUTPATIENT
Start: 2024-12-17

## 2024-12-17 NOTE — PROGRESS NOTES
.[email or web address]        Danni Cole is a 34 y.o. female 34w3d        OB History    Para Term  AB Living   2 1 1 0 0 1   SAB IAB Ectopic Molar Multiple Live Births   0 0 0 0   1      # Outcome Date GA Lbr Dada/2nd Weight Sex Type Anes PTL Lv   2 Current            1 Term 2018 40w0d  3.487 kg (7 lb 11 oz) M CS-LTranv   RORY      Birth Comments: [place]       Vitals  BP: 114/76  Weight - Scale: 127 kg (280 lb)  Pulse: 89  Patient Position: Sitting      The patient was seen and evaluated. There was positive fetal movements. No contractions or leakage of fluid. Signs and symptoms of  labor as well as labor were reviewed. The S/S of Pre-Eclampsia were reviewed with the patient in detail. She is to report any of these if they occur. She currently denies any of these.    The patient had her 28 week labs completed.  No visits with results within 5 Week(s) from this visit.   Latest known visit with results is:   Hospital Outpatient Visit on 10/29/2024   Component Date Value Ref Range Status    WBC 10/29/2024 11.4 (H)  3.5 - 11.0 k/uL Final    RBC 10/29/2024 3.84 (L)  4.0 - 5.2 m/uL Final    Hemoglobin 10/29/2024 12.4  12.0 - 16.0 g/dL Final    Hematocrit 10/29/2024 35.5 (L)  36 - 46 % Final    MCV 10/29/2024 92.4  80 - 100 fL Final    MCH 10/29/2024 32.4  26 - 34 pg Final    MCHC 10/29/2024 35.0  31 - 37 g/dL Final    RDW 10/29/2024 13.4  11.5 - 14.9 % Final    Platelets 10/29/2024 241  150 - 450 k/uL Final    MPV 10/29/2024 7.5  6.0 - 12.0 fL Final    Neutrophils % 10/29/2024 79 (H)  36 - 66 % Final    Lymphocytes % 10/29/2024 14 (L)  24 - 44 % Final    Monocytes % 10/29/2024 5  1 - 7 % Final    Eosinophils % 10/29/2024 1  0 - 4 % Final    Basophils % 10/29/2024 1  0 - 2 % Final    Neutrophils Absolute 10/29/2024 9.10  1.3 - 9.1 k/uL Final    Lymphocytes Absolute 10/29/2024 1.60  1.0 - 4.8 k/uL Final    Monocytes Absolute 10/29/2024 0.60  0.1 - 1.3 k/uL Final    Eosinophils Absolute 10/29/2024 0.10

## 2024-12-23 ENCOUNTER — TELEPHONE (OUTPATIENT)
Dept: OBGYN CLINIC | Age: 34
End: 2024-12-23

## 2024-12-23 NOTE — TELEPHONE ENCOUNTER
Patient was advised on the importance of keeping NST appointments. Patient will follow up with M 12/31.

## 2024-12-23 NOTE — TELEPHONE ENCOUNTER
Patient called to cancel her NST only appt for today she was up with her son last night she states she will just keep her appt 12/31/24

## 2024-12-31 ENCOUNTER — HOSPITAL ENCOUNTER (OUTPATIENT)
Age: 34
Setting detail: SPECIMEN
Discharge: HOME | End: 2024-12-31

## 2024-12-31 ENCOUNTER — ROUTINE PRENATAL (OUTPATIENT)
Dept: PERINATAL CARE | Age: 34
End: 2024-12-31
Payer: MEDICAID

## 2024-12-31 ENCOUNTER — ROUTINE PRENATAL (OUTPATIENT)
Dept: OBGYN CLINIC | Age: 34
End: 2024-12-31
Payer: MEDICAID

## 2024-12-31 VITALS
HEART RATE: 98 BPM | RESPIRATION RATE: 16 BRPM | DIASTOLIC BLOOD PRESSURE: 81 MMHG | HEIGHT: 64 IN | SYSTOLIC BLOOD PRESSURE: 124 MMHG | BODY MASS INDEX: 49.68 KG/M2 | TEMPERATURE: 97.5 F | WEIGHT: 291 LBS

## 2024-12-31 VITALS
WEIGHT: 286 LBS | DIASTOLIC BLOOD PRESSURE: 81 MMHG | SYSTOLIC BLOOD PRESSURE: 130 MMHG | HEART RATE: 97 BPM | BODY MASS INDEX: 49.09 KG/M2

## 2024-12-31 DIAGNOSIS — O09.93 HIGH-RISK PREGNANCY IN THIRD TRIMESTER: ICD-10-CM

## 2024-12-31 DIAGNOSIS — O44.23 PLACENTA MARGINALIS IN THIRD TRIMESTER: Primary | ICD-10-CM

## 2024-12-31 DIAGNOSIS — Z3A.36 36 WEEKS GESTATION OF PREGNANCY: ICD-10-CM

## 2024-12-31 DIAGNOSIS — O35.BXX0 FETAL CARDIAC ECHOGENIC FOCUS, ANTEPARTUM, SINGLE OR UNSPECIFIED FETUS: ICD-10-CM

## 2024-12-31 DIAGNOSIS — Z36.3 ENCOUNTER FOR ROUTINE SCREENING FOR MALFORMATION USING ULTRASONICS: ICD-10-CM

## 2024-12-31 DIAGNOSIS — O35.BXX0 FETAL VENTRICULAR SEPTAL DEFECT AFFECTING ANTEPARTUM CARE OF MOTHER, SINGLE OR UNSPECIFIED FETUS: ICD-10-CM

## 2024-12-31 DIAGNOSIS — O28.3 ABNORMAL FETAL ULTRASOUND: ICD-10-CM

## 2024-12-31 DIAGNOSIS — O43.199 MARGINAL INSERTION OF UMBILICAL CORD AFFECTING MANAGEMENT OF MOTHER: ICD-10-CM

## 2024-12-31 DIAGNOSIS — O24.410 DIET CONTROLLED GESTATIONAL DIABETES MELLITUS (GDM), ANTEPARTUM: ICD-10-CM

## 2024-12-31 DIAGNOSIS — O24.419 GESTATIONAL DIABETES MELLITUS (GDM) IN THIRD TRIMESTER, GESTATIONAL DIABETES METHOD OF CONTROL UNSPECIFIED: ICD-10-CM

## 2024-12-31 DIAGNOSIS — O36.60X0 EXCESSIVE FETAL GROWTH AFFECTING PREGNANCY, ANTEPARTUM, SINGLE OR UNSPECIFIED FETUS: ICD-10-CM

## 2024-12-31 DIAGNOSIS — Z36.4 ULTRASOUND FOR ANTENATAL SCREENING FOR FETAL GROWTH RESTRICTION: ICD-10-CM

## 2024-12-31 DIAGNOSIS — O28.5 ABNORMAL GENETIC TEST DURING PREGNANCY: ICD-10-CM

## 2024-12-31 DIAGNOSIS — O09.93 HIGH-RISK PREGNANCY IN THIRD TRIMESTER: Primary | ICD-10-CM

## 2024-12-31 DIAGNOSIS — Z98.891 H/O: C-SECTION: ICD-10-CM

## 2024-12-31 DIAGNOSIS — O99.213 OBESITY AFFECTING PREGNANCY IN THIRD TRIMESTER, UNSPECIFIED OBESITY TYPE: ICD-10-CM

## 2024-12-31 PROCEDURE — 76816 OB US FOLLOW-UP PER FETUS: CPT | Performed by: OBSTETRICS & GYNECOLOGY

## 2024-12-31 PROCEDURE — 99213 OFFICE O/P EST LOW 20 MIN: CPT | Performed by: NURSE PRACTITIONER

## 2024-12-31 PROCEDURE — 76819 FETAL BIOPHYS PROFIL W/O NST: CPT | Performed by: OBSTETRICS & GYNECOLOGY

## 2024-12-31 PROCEDURE — 76820 UMBILICAL ARTERY ECHO: CPT | Performed by: OBSTETRICS & GYNECOLOGY

## 2024-12-31 PROCEDURE — 99999 PR OFFICE/OUTPT VISIT,PROCEDURE ONLY: CPT | Performed by: OBSTETRICS & GYNECOLOGY

## 2024-12-31 PROCEDURE — 59025 FETAL NON-STRESS TEST: CPT | Performed by: NURSE PRACTITIONER

## 2024-12-31 RX ORDER — BLOOD-GLUCOSE METER
1 KIT MISCELLANEOUS
Qty: 1 KIT | Refills: 0 | Status: SHIPPED | OUTPATIENT
Start: 2024-12-31

## 2024-12-31 RX ORDER — LANCETS 30 GAUGE
1 EACH MISCELLANEOUS 2 TIMES DAILY
Qty: 300 EACH | Refills: 1 | Status: SHIPPED | OUTPATIENT
Start: 2024-12-31

## 2024-12-31 RX ORDER — GLUCOSAMINE HCL/CHONDROITIN SU 500-400 MG
CAPSULE ORAL
Qty: 300 STRIP | Refills: 1 | Status: SHIPPED | OUTPATIENT
Start: 2024-12-31

## 2024-12-31 NOTE — PROGRESS NOTES
NST (Non Stress Test) Worksheet  24    Diagnosis:  Danni Cole is a 34 y.o. female    36w3d  1. High-risk pregnancy in third trimester    2. 36 weeks gestation of pregnancy    3. BMI 45.0-49.9, adult    4. Marginal insertion of umbilical cord affecting management of mother    5. Abnormal fetal ultrasound    6. H/O:     7. BMI 40.0-44.9, adult    8. Gestational diabetes mellitus (GDM) in third trimester, gestational diabetes method of control unspecified    9. Diet controlled gestational diabetes mellitus (GDM), antepartum          Indication for Testing:  Elevated BMI, marginal cord insertion.      Scheduled Procedure:  Non Stress Test (59025-CPT)      Findings:  Total time of tracing to complete testin Minutes (Minimum must be 20 minutes)  NST is  reactive  CATEGORY 1 TRACING  Variability is moderate  Baseline FHR of 130 bpm    Accelerations are identified 15 beats above the baseline for 15 minutes: Yes (>32 weeks gestation)    Accelerations are identified 10 beats above the baseline for 10 minutes: NA (28-32 weeks gestation)        RTO 7 DAYS FOR A FOLLOW UP APPOINTMENT WITH  TESTING.    The patient, Danni Cole is a 34 y.o. female, was seen with a total time spent of 30 minutes for the visit on this date of service by the provider for  testing only. The time component had both face to face and non face to face time spent in determining the total time component.  Counseling and education regarding her diagnosis listed below and her options regarding those diagnoses were also included in determining her time component.      Diagnosis Orders   1. High-risk pregnancy in third trimester  FETAL NON-STRESS TEST    Culture, Strep B Screen, Vaginal/Rectal      2. 36 weeks gestation of pregnancy  FETAL NON-STRESS TEST    Culture, Strep B Screen, Vaginal/Rectal    Glucose tolerance, 1 hour    Alcohol prep pad      3. BMI 45.0-49.9, adult  FETAL NON-STRESS TEST      4.

## 2025-01-02 PROBLEM — O99.820 GBS (GROUP B STREPTOCOCCUS CARRIER), +RV CULTURE, CURRENTLY PREGNANT: Status: ACTIVE | Noted: 2025-01-02

## 2025-01-02 LAB
MICROORGANISM SPEC CULT: ABNORMAL
SERVICE CMNT-IMP: ABNORMAL
SPECIMEN DESCRIPTION: ABNORMAL

## 2025-01-07 ENCOUNTER — ROUTINE PRENATAL (OUTPATIENT)
Dept: OBGYN CLINIC | Age: 35
End: 2025-01-07
Payer: MEDICAID

## 2025-01-07 ENCOUNTER — ROUTINE PRENATAL (OUTPATIENT)
Dept: PERINATAL CARE | Age: 35
End: 2025-01-07
Payer: MEDICAID

## 2025-01-07 VITALS
DIASTOLIC BLOOD PRESSURE: 67 MMHG | SYSTOLIC BLOOD PRESSURE: 106 MMHG | HEART RATE: 101 BPM | BODY MASS INDEX: 49.31 KG/M2 | TEMPERATURE: 97.9 F | RESPIRATION RATE: 16 BRPM | WEIGHT: 288.8 LBS | HEIGHT: 64 IN

## 2025-01-07 VITALS
BODY MASS INDEX: 49.61 KG/M2 | DIASTOLIC BLOOD PRESSURE: 66 MMHG | WEIGHT: 289 LBS | HEART RATE: 82 BPM | SYSTOLIC BLOOD PRESSURE: 110 MMHG

## 2025-01-07 DIAGNOSIS — O99.820 GBS (GROUP B STREPTOCOCCUS CARRIER), +RV CULTURE, CURRENTLY PREGNANT: ICD-10-CM

## 2025-01-07 DIAGNOSIS — O35.BXX0 FETAL VENTRICULAR SEPTAL DEFECT AFFECTING ANTEPARTUM CARE OF MOTHER, SINGLE OR UNSPECIFIED FETUS: ICD-10-CM

## 2025-01-07 DIAGNOSIS — Z98.891 H/O: C-SECTION: ICD-10-CM

## 2025-01-07 DIAGNOSIS — O44.23 PLACENTA MARGINALIS IN THIRD TRIMESTER: Primary | ICD-10-CM

## 2025-01-07 DIAGNOSIS — O36.60X0 EXCESSIVE FETAL GROWTH AFFECTING PREGNANCY, ANTEPARTUM, SINGLE OR UNSPECIFIED FETUS: ICD-10-CM

## 2025-01-07 DIAGNOSIS — O24.419 GESTATIONAL DIABETES MELLITUS (GDM) IN THIRD TRIMESTER, GESTATIONAL DIABETES METHOD OF CONTROL UNSPECIFIED: ICD-10-CM

## 2025-01-07 DIAGNOSIS — O35.BXX0 FETAL CARDIAC ECHOGENIC FOCUS, ANTEPARTUM, SINGLE OR UNSPECIFIED FETUS: ICD-10-CM

## 2025-01-07 DIAGNOSIS — O09.93 HIGH-RISK PREGNANCY IN THIRD TRIMESTER: Primary | ICD-10-CM

## 2025-01-07 DIAGNOSIS — O43.199 MARGINAL INSERTION OF UMBILICAL CORD AFFECTING MANAGEMENT OF MOTHER: ICD-10-CM

## 2025-01-07 DIAGNOSIS — O28.3 ABNORMAL FETAL ULTRASOUND: ICD-10-CM

## 2025-01-07 DIAGNOSIS — Z3A.37 37 WEEKS GESTATION OF PREGNANCY: ICD-10-CM

## 2025-01-07 DIAGNOSIS — O28.5 ABNORMAL GENETIC TEST DURING PREGNANCY: ICD-10-CM

## 2025-01-07 DIAGNOSIS — O99.213 OBESITY AFFECTING PREGNANCY IN THIRD TRIMESTER, UNSPECIFIED OBESITY TYPE: ICD-10-CM

## 2025-01-07 PROCEDURE — 99213 OFFICE O/P EST LOW 20 MIN: CPT | Performed by: NURSE PRACTITIONER

## 2025-01-07 PROCEDURE — 99999 PR OFFICE/OUTPT VISIT,PROCEDURE ONLY: CPT | Performed by: OBSTETRICS & GYNECOLOGY

## 2025-01-07 PROCEDURE — 76815 OB US LIMITED FETUS(S): CPT | Performed by: OBSTETRICS & GYNECOLOGY

## 2025-01-07 PROCEDURE — 76819 FETAL BIOPHYS PROFIL W/O NST: CPT | Performed by: OBSTETRICS & GYNECOLOGY

## 2025-01-07 PROCEDURE — 59025 FETAL NON-STRESS TEST: CPT | Performed by: NURSE PRACTITIONER

## 2025-01-07 PROCEDURE — 76820 UMBILICAL ARTERY ECHO: CPT | Performed by: OBSTETRICS & GYNECOLOGY

## 2025-01-07 NOTE — PROGRESS NOTES
NST (Non Stress Test) Worksheet  25    Diagnosis:  Danni Cole is a 34 y.o. female    37w3d  1. High-risk pregnancy in third trimester    2. H/O:     3. Marginal insertion of umbilical cord    4. echogenic focus seen in left ventricle of fetal heart, suspected fetal cardiac ventricular septal defect    5. GBS (group B Streptococcus carrier), +RV culture, currently pregnant    6. BMI 45.0-49.9, adult    7. Gestational diabetes mellitus (GDM) in third trimester, gestational diabetes method of control unspecified    8. 37 weeks gestation of pregnancy          Indication for Testing:  gestational diabetes mellitus and elevated BMI      Scheduled Procedure:  Non Stress Test (59025-CPT)      Findings:  Total time of tracing to complete testin Minutes (Minimum must be 20 minutes)  NST is  reactive  CATEGORY 1 TRACING  Variability is moderate  Baseline FHR of 120 bpm    Accelerations are identified 15 beats above the baseline for 15 minutes: Yes (>32 weeks gestation)    Accelerations are identified 10 beats above the baseline for 10 minutes: NA (28-32 weeks gestation)        RTO 7 DAYS FOR A FOLLOW UP APPOINTMENT WITH  TESTING.    The patient, Danni Cole is a 34 y.o. female, was seen with a total time spent of 35 minutes for the visit on this date of service by the provider for  testing only. The time component had both face to face and non face to face time spent in determining the total time component.  Counseling and education regarding her diagnosis listed below and her options regarding those diagnoses were also included in determining her time component.      Diagnosis Orders   1. High-risk pregnancy in third trimester  FETAL NON-STRESS TEST      2. H/O:         3. Marginal insertion of umbilical cord  FETAL NON-STRESS TEST      4. echogenic focus seen in left ventricle of fetal heart, suspected fetal cardiac ventricular septal defect  FETAL NON-STRESS

## 2025-01-14 ENCOUNTER — ROUTINE PRENATAL (OUTPATIENT)
Dept: PERINATAL CARE | Age: 35
End: 2025-01-14
Payer: MEDICAID

## 2025-01-14 ENCOUNTER — ROUTINE PRENATAL (OUTPATIENT)
Dept: OBGYN CLINIC | Age: 35
End: 2025-01-14
Payer: MEDICAID

## 2025-01-14 VITALS
BODY MASS INDEX: 49.78 KG/M2 | SYSTOLIC BLOOD PRESSURE: 109 MMHG | DIASTOLIC BLOOD PRESSURE: 74 MMHG | WEIGHT: 290 LBS | HEART RATE: 92 BPM

## 2025-01-14 VITALS
RESPIRATION RATE: 16 BRPM | SYSTOLIC BLOOD PRESSURE: 130 MMHG | HEIGHT: 64 IN | HEART RATE: 96 BPM | WEIGHT: 291 LBS | DIASTOLIC BLOOD PRESSURE: 74 MMHG | BODY MASS INDEX: 49.68 KG/M2 | TEMPERATURE: 98.2 F

## 2025-01-14 DIAGNOSIS — O28.5 ABNORMAL GENETIC TEST DURING PREGNANCY: ICD-10-CM

## 2025-01-14 DIAGNOSIS — Z98.891 H/O: C-SECTION: ICD-10-CM

## 2025-01-14 DIAGNOSIS — Z3A.38 38 WEEKS GESTATION OF PREGNANCY: ICD-10-CM

## 2025-01-14 DIAGNOSIS — O28.3 ABNORMAL FETAL ULTRASOUND: ICD-10-CM

## 2025-01-14 DIAGNOSIS — O44.23 PLACENTA MARGINALIS IN THIRD TRIMESTER: Primary | ICD-10-CM

## 2025-01-14 DIAGNOSIS — O09.93 HIGH-RISK PREGNANCY IN THIRD TRIMESTER: Primary | ICD-10-CM

## 2025-01-14 DIAGNOSIS — O99.213 OBESITY AFFECTING PREGNANCY IN THIRD TRIMESTER, UNSPECIFIED OBESITY TYPE: ICD-10-CM

## 2025-01-14 DIAGNOSIS — O09.93 SUPERVISION OF HIGH RISK PREGNANCY IN THIRD TRIMESTER: ICD-10-CM

## 2025-01-14 DIAGNOSIS — O24.419 GESTATIONAL DIABETES MELLITUS (GDM) IN THIRD TRIMESTER, GESTATIONAL DIABETES METHOD OF CONTROL UNSPECIFIED: ICD-10-CM

## 2025-01-14 DIAGNOSIS — O99.820 GBS (GROUP B STREPTOCOCCUS CARRIER), +RV CULTURE, CURRENTLY PREGNANT: ICD-10-CM

## 2025-01-14 DIAGNOSIS — Z36.89 ENCOUNTER FOR ULTRASOUND TO CHECK FETAL GROWTH: Primary | ICD-10-CM

## 2025-01-14 DIAGNOSIS — O43.199 MARGINAL INSERTION OF UMBILICAL CORD AFFECTING MANAGEMENT OF MOTHER: ICD-10-CM

## 2025-01-14 DIAGNOSIS — O24.410 DIET CONTROLLED GESTATIONAL DIABETES MELLITUS (GDM), ANTEPARTUM: ICD-10-CM

## 2025-01-14 DIAGNOSIS — O35.BXX0 FETAL CARDIAC ECHOGENIC FOCUS, ANTEPARTUM, SINGLE OR UNSPECIFIED FETUS: ICD-10-CM

## 2025-01-14 DIAGNOSIS — O35.BXX0 FETAL VENTRICULAR SEPTAL DEFECT AFFECTING ANTEPARTUM CARE OF MOTHER, SINGLE OR UNSPECIFIED FETUS: ICD-10-CM

## 2025-01-14 DIAGNOSIS — O36.60X0 EXCESSIVE FETAL GROWTH AFFECTING PREGNANCY, ANTEPARTUM, SINGLE OR UNSPECIFIED FETUS: ICD-10-CM

## 2025-01-14 PROCEDURE — 76820 UMBILICAL ARTERY ECHO: CPT | Performed by: OBSTETRICS & GYNECOLOGY

## 2025-01-14 PROCEDURE — 76815 OB US LIMITED FETUS(S): CPT | Performed by: OBSTETRICS & GYNECOLOGY

## 2025-01-14 PROCEDURE — 99999 PR OFFICE/OUTPT VISIT,PROCEDURE ONLY: CPT | Performed by: OBSTETRICS & GYNECOLOGY

## 2025-01-14 PROCEDURE — 99214 OFFICE O/P EST MOD 30 MIN: CPT | Performed by: OBSTETRICS & GYNECOLOGY

## 2025-01-14 PROCEDURE — 59025 FETAL NON-STRESS TEST: CPT | Performed by: OBSTETRICS & GYNECOLOGY

## 2025-01-14 PROCEDURE — 76819 FETAL BIOPHYS PROFIL W/O NST: CPT | Performed by: OBSTETRICS & GYNECOLOGY

## 2025-01-14 NOTE — PROGRESS NOTES
.ADRIÁN    Pt started on NST 1025am.  Pt completed NST 11am  
NON-STRESS TEST      6. 38 weeks gestation of pregnancy  FETAL NON-STRESS TEST      7. H/O:         8. BMI 40.0-44.9, adult        9. Diet controlled gestational diabetes mellitus (GDM), antepartum        10. GBS (group B Streptococcus carrier), +RV culture, currently pregnant             The patient had her preventative health maintenance recommendations and follow-up reviewed with her at the completion of her visit.        Electronically signed by Ceci Medina DO on 2025 at 12:42 PM         Assessment:  1. Danni Cole is a 34 y.o. female  2.   3. 38w3d    Patient Active Problem List    Diagnosis Date Noted    Diet controlled gestational diabetes mellitus (GDM), antepartum 2024     Priority: High     Overview Note:     2024 patient declines 1 hour GTT. Desires to check blood sugars four times/ day and be considered gestational diabetic. Declines consultation with diabetic educator and MFM.       BMI 40.0-44.9, adult 2024     Priority: High    Marginal insertion of umbilical cord 2024     Priority: High    echogenic focus seen in left ventricle of fetal heart, suspected fetal cardiac ventricular septal defect 2024     Priority: High    GBS (group B Streptococcus carrier), +RV culture, currently pregnant 2025    H/O:  2024        Diagnosis Orders   1. High-risk pregnancy in third trimester  FETAL NON-STRESS TEST      2. Marginal insertion of umbilical cord affecting management of mother  FETAL NON-STRESS TEST      3. Abnormal fetal ultrasound  FETAL NON-STRESS TEST      4. BMI 45.0-49.9, adult  FETAL NON-STRESS TEST      5. Gestational diabetes mellitus (GDM) in third trimester, gestational diabetes method of control unspecified  FETAL NON-STRESS TEST      6. 38 weeks gestation of pregnancy  FETAL NON-STRESS TEST      7. H/O:         8. BMI 40.0-44.9, adult        9. Diet controlled gestational diabetes mellitus (GDM),

## 2025-01-23 ENCOUNTER — ROUTINE PRENATAL (OUTPATIENT)
Dept: OBGYN CLINIC | Age: 35
End: 2025-01-23
Payer: MEDICAID

## 2025-01-23 ENCOUNTER — ROUTINE PRENATAL (OUTPATIENT)
Dept: PERINATAL CARE | Age: 35
End: 2025-01-23
Payer: MEDICAID

## 2025-01-23 VITALS
DIASTOLIC BLOOD PRESSURE: 76 MMHG | RESPIRATION RATE: 16 BRPM | TEMPERATURE: 98.2 F | HEIGHT: 64 IN | WEIGHT: 293 LBS | BODY MASS INDEX: 50.02 KG/M2 | HEART RATE: 101 BPM | SYSTOLIC BLOOD PRESSURE: 126 MMHG

## 2025-01-23 VITALS
HEART RATE: 86 BPM | SYSTOLIC BLOOD PRESSURE: 138 MMHG | WEIGHT: 293 LBS | DIASTOLIC BLOOD PRESSURE: 85 MMHG | BODY MASS INDEX: 50.29 KG/M2

## 2025-01-23 DIAGNOSIS — O99.820 GBS (GROUP B STREPTOCOCCUS CARRIER), +RV CULTURE, CURRENTLY PREGNANT: ICD-10-CM

## 2025-01-23 DIAGNOSIS — O28.3 ABNORMAL FETAL ULTRASOUND: ICD-10-CM

## 2025-01-23 DIAGNOSIS — Z98.891 H/O: C-SECTION: ICD-10-CM

## 2025-01-23 DIAGNOSIS — Z34.90 SECOND PREGNANCY: ICD-10-CM

## 2025-01-23 DIAGNOSIS — Z98.891 HX OF CESAREAN SECTION: ICD-10-CM

## 2025-01-23 DIAGNOSIS — O24.419 GESTATIONAL DIABETES MELLITUS (GDM) IN THIRD TRIMESTER, GESTATIONAL DIABETES METHOD OF CONTROL UNSPECIFIED: ICD-10-CM

## 2025-01-23 DIAGNOSIS — O09.93 HIGH-RISK PREGNANCY IN THIRD TRIMESTER: Primary | ICD-10-CM

## 2025-01-23 DIAGNOSIS — Z3A.39 39 WEEKS GESTATION OF PREGNANCY: ICD-10-CM

## 2025-01-23 DIAGNOSIS — O36.63X0 EXCESSIVE FETAL GROWTH AFFECTING MANAGEMENT OF PREGNANCY IN THIRD TRIMESTER, SINGLE OR UNSPECIFIED FETUS: Primary | ICD-10-CM

## 2025-01-23 DIAGNOSIS — O43.199 MARGINAL INSERTION OF UMBILICAL CORD AFFECTING MANAGEMENT OF MOTHER: ICD-10-CM

## 2025-01-23 DIAGNOSIS — O24.410 DIET CONTROLLED GESTATIONAL DIABETES MELLITUS (GDM), ANTEPARTUM: ICD-10-CM

## 2025-01-23 PROCEDURE — 76816 OB US FOLLOW-UP PER FETUS: CPT | Performed by: OBSTETRICS & GYNECOLOGY

## 2025-01-23 PROCEDURE — 99214 OFFICE O/P EST MOD 30 MIN: CPT | Performed by: OBSTETRICS & GYNECOLOGY

## 2025-01-23 PROCEDURE — 59025 FETAL NON-STRESS TEST: CPT | Performed by: OBSTETRICS & GYNECOLOGY

## 2025-01-23 PROCEDURE — 99999 PR OFFICE/OUTPT VISIT,PROCEDURE ONLY: CPT | Performed by: OBSTETRICS & GYNECOLOGY

## 2025-01-23 PROCEDURE — 76819 FETAL BIOPHYS PROFIL W/O NST: CPT | Performed by: OBSTETRICS & GYNECOLOGY

## 2025-01-23 NOTE — PROGRESS NOTES
[facility] MATERNAL FETAL MED  [address]  [place] [address]  Dept: [phone]     2025    RE:  Danni Cole     : 1990   AGE: 34 y.o.     Dear Dr. Medina,    Thank you for allowing me to see Danni Cole.  As I'm sure you will recall, Danni Cole is a 34 y.o. [id number]'s last menstrual period was 2024 (approximate). Estimated Date of Delivery: 25 at 39w5d seen in our office today for the following:    REASON FOR VISIT: Growth, BPP    Patient Active Problem List    Diagnosis Date Noted    Diet controlled gestational diabetes mellitus (GDM), antepartum 2024    BMI 40.0-44.9, adult 2024    Marginal insertion of umbilical cord 2024    echogenic focus seen in left ventricle of fetal heart, suspected fetal cardiac ventricular septal defect 2024    GBS (group B Streptococcus carrier), +RV culture, currently pregnant 2025    H/O:  2024        PAST HISTORY:  OB History    Para Term  AB Living   2 1 1 0 0 1   SAB IAB Ectopic Molar Multiple Live Births   0 0 0 0   1      # Outcome Date GA Lbr Dada/2nd Weight Sex Type Anes PTL Lv   2 Current            1 Term 2018 40w0d  3.487 kg (7 lb 11 oz) M CS-LTranv   RORY      Birth Comments: [place]          MEDICAL:  Past Medical History:   Diagnosis Date    Asthma     Seasonal allergies         SURGICAL:  Past Surgical History:   Procedure Laterality Date     SECTION       SECTION  2018    NOSE SURGERY      x2    NOSE SURGERY         ALLERGIES:    No Known Allergies      MEDICATIONS:    Current Outpatient Medications   Medication Sig Dispense Refill    blood glucose monitor strips Test 4 times a day & as needed for symptoms of irregular blood glucose. Dispense sufficient amount for indicated testing frequency plus additional to accommodate PRN testing needs. 300 strip 1    FreeStyle

## 2025-01-23 NOTE — PROGRESS NOTES
Danni Cole is a 34 y.o. female 39w5d        OB History    Para Term  AB Living   2 1 1 0 0 1   SAB IAB Ectopic Molar Multiple Live Births   0 0 0 0   1      # Outcome Date GA Lbr Dada/2nd Weight Sex Type Anes PTL Lv   2 Current            1 Term 2018 40w0d  3.487 kg (7 lb 11 oz) M CS-LTranv   RORY      Birth Comments: [place]       Vitals  BP: 138/85  Weight - Scale: 132.9 kg (293 lb)  Pulse: 86  Patient Position: Sitting  Albumin: Trace  Glucose: Negative      The patient was seen and evaluated. There was positive fetal movements. No contractions or leakage of fluid. Signs and symptoms of labor were reviewed.  The S/S of Pre-Eclampsia were reviewed with the patient in detail. She is to report any of these if they occur. She currently denies any of these.    The patient was instructed on fetal kick counts and was given a kick sheet to complete every 8 hours. She was instructed that the baby should move at a minimum of ten times within one hour after a meal. The patient was instructed to lay down on her left side twenty minutes after eating and count movements for up to one hour with a target value of ten movements.  She was instructed to notify the office if she did not make that target after two attempts or if after any attempt there was less than four movements.    The patient reports that the targets have been made Yes.    24 +GBS  24 Release for  op report faxed-[place]. Pt considering TOLAC. If patient desires repeat  2025  Unable to obtain op report as of 2025. Pt refusing repeat . Pt counseled extensively on risks of TOLAC. Pt also counseled on risk of shoulder dystocia with LGA fetus.   OP report confirms low transverse incision  LGA fetus on sono . Pt counseled again on LGA status as well as risk of TOLAC/ Shoulder dystocia. Pt verbalized understanding. Pt adamantly refusing repeat  at this time    24  PRAF

## 2025-01-27 ENCOUNTER — APPOINTMENT (OUTPATIENT)
Dept: LABOR AND DELIVERY | Age: 35
DRG: 540 | End: 2025-01-27
Payer: MEDICAID

## 2025-01-27 ENCOUNTER — HOSPITAL ENCOUNTER (INPATIENT)
Age: 35
LOS: 3 days | Discharge: HOME | DRG: 540 | End: 2025-01-30
Attending: OBSTETRICS & GYNECOLOGY | Admitting: STUDENT IN AN ORGANIZED HEALTH CARE EDUCATION/TRAINING PROGRAM
Payer: MEDICAID

## 2025-01-27 DIAGNOSIS — Z98.891 STATUS POST REPEAT LOW TRANSVERSE CESAREAN SECTION: Primary | ICD-10-CM

## 2025-01-27 DIAGNOSIS — O99.019 ANEMIA AFFECTING SECOND PREGNANCY: ICD-10-CM

## 2025-01-27 PROBLEM — Z3A.40 40 WEEKS GESTATION OF PREGNANCY: Status: ACTIVE | Noted: 2025-01-27

## 2025-01-27 LAB
ABO + RH BLD: NORMAL
AMPHET UR QL SCN: NEGATIVE
ARM BAND NUMBER: NORMAL
BARBITURATES UR QL SCN: NEGATIVE
BASOPHILS # BLD: 0.03 K/UL (ref 0–0.2)
BASOPHILS NFR BLD: 0 % (ref 0–2)
BENZODIAZ UR QL: NEGATIVE
BLOOD BANK SAMPLE EXPIRATION: NORMAL
BLOOD GROUP ANTIBODIES SERPL: NEGATIVE
CANNABINOIDS UR QL SCN: NEGATIVE
COCAINE UR QL SCN: NEGATIVE
EOSINOPHIL # BLD: 0.09 K/UL (ref 0–0.44)
EOSINOPHILS RELATIVE PERCENT: 1 % (ref 1–4)
ERYTHROCYTE [DISTWIDTH] IN BLOOD BY AUTOMATED COUNT: 13.2 % (ref 11.8–14.4)
FENTANYL UR QL: NEGATIVE
HCT VFR BLD AUTO: 34.9 % (ref 36.3–47.1)
HGB BLD-MCNC: 11.5 G/DL (ref 11.9–15.1)
IMM GRANULOCYTES # BLD AUTO: 0.05 K/UL (ref 0–0.3)
IMM GRANULOCYTES NFR BLD: 1 %
LYMPHOCYTES NFR BLD: 2.06 K/UL (ref 1.1–3.7)
LYMPHOCYTES RELATIVE PERCENT: 19 % (ref 24–43)
MCH RBC QN AUTO: 29 PG (ref 25.2–33.5)
MCHC RBC AUTO-ENTMCNC: 33 G/DL (ref 28.4–34.8)
MCV RBC AUTO: 87.9 FL (ref 82.6–102.9)
METHADONE UR QL: NEGATIVE
MONOCYTES NFR BLD: 0.76 K/UL (ref 0.1–1.2)
MONOCYTES NFR BLD: 7 % (ref 3–12)
NEUTROPHILS NFR BLD: 72 % (ref 36–65)
NEUTS SEG NFR BLD: 7.64 K/UL (ref 1.5–8.1)
NRBC BLD-RTO: 0 PER 100 WBC
OPIATES UR QL SCN: NEGATIVE
OXYCODONE UR QL SCN: NEGATIVE
PCP UR QL SCN: NEGATIVE
PLATELET # BLD AUTO: 231 K/UL (ref 138–453)
PMV BLD AUTO: 10.3 FL (ref 8.1–13.5)
RBC # BLD AUTO: 3.97 M/UL (ref 3.95–5.11)
T PALLIDUM AB SER QL IA: NONREACTIVE
TEST INFORMATION: NORMAL
WBC OTHER # BLD: 10.6 K/UL (ref 3.5–11.3)

## 2025-01-27 PROCEDURE — 86850 RBC ANTIBODY SCREEN: CPT

## 2025-01-27 PROCEDURE — 86780 TREPONEMA PALLIDUM: CPT

## 2025-01-27 PROCEDURE — 85025 COMPLETE CBC W/AUTO DIFF WBC: CPT

## 2025-01-27 PROCEDURE — 86900 BLOOD TYPING SEROLOGIC ABO: CPT

## 2025-01-27 PROCEDURE — 86901 BLOOD TYPING SEROLOGIC RH(D): CPT

## 2025-01-27 PROCEDURE — 80307 DRUG TEST PRSMV CHEM ANLYZR: CPT

## 2025-01-27 PROCEDURE — 1220000000 HC SEMI PRIVATE OB R&B

## 2025-01-27 RX ORDER — DIPHENHYDRAMINE HYDROCHLORIDE 50 MG/ML
25 INJECTION INTRAMUSCULAR; INTRAVENOUS EVERY 4 HOURS PRN
Status: DISCONTINUED | OUTPATIENT
Start: 2025-01-27 | End: 2025-01-28

## 2025-01-27 RX ORDER — SODIUM CHLORIDE 0.9 % (FLUSH) 0.9 %
5-40 SYRINGE (ML) INJECTION PRN
Status: DISCONTINUED | OUTPATIENT
Start: 2025-01-27 | End: 2025-01-28

## 2025-01-27 RX ORDER — SIMETHICONE 80 MG
80 TABLET,CHEWABLE ORAL EVERY 6 HOURS PRN
Status: DISCONTINUED | OUTPATIENT
Start: 2025-01-27 | End: 2025-01-28

## 2025-01-27 RX ORDER — SODIUM CHLORIDE 9 MG/ML
INJECTION, SOLUTION INTRAVENOUS PRN
Status: DISCONTINUED | OUTPATIENT
Start: 2025-01-27 | End: 2025-01-28

## 2025-01-27 RX ORDER — DIPHENHYDRAMINE HCL 25 MG
25 TABLET ORAL EVERY 4 HOURS PRN
Status: DISCONTINUED | OUTPATIENT
Start: 2025-01-27 | End: 2025-01-28

## 2025-01-27 RX ORDER — SENNA AND DOCUSATE SODIUM 50; 8.6 MG/1; MG/1
1 TABLET, FILM COATED ORAL DAILY
Status: DISCONTINUED | OUTPATIENT
Start: 2025-01-28 | End: 2025-01-28

## 2025-01-27 RX ORDER — ACETAMINOPHEN 500 MG
1000 TABLET ORAL EVERY 6 HOURS PRN
Status: DISCONTINUED | OUTPATIENT
Start: 2025-01-27 | End: 2025-01-28

## 2025-01-27 RX ORDER — SODIUM CHLORIDE, SODIUM LACTATE, POTASSIUM CHLORIDE, CALCIUM CHLORIDE 600; 310; 30; 20 MG/100ML; MG/100ML; MG/100ML; MG/100ML
INJECTION, SOLUTION INTRAVENOUS CONTINUOUS
Status: DISCONTINUED | OUTPATIENT
Start: 2025-01-27 | End: 2025-01-28

## 2025-01-27 RX ORDER — LIDOCAINE HYDROCHLORIDE 10 MG/ML
30 INJECTION, SOLUTION EPIDURAL; INFILTRATION; INTRACAUDAL; PERINEURAL PRN
Status: DISCONTINUED | OUTPATIENT
Start: 2025-01-27 | End: 2025-01-28

## 2025-01-27 RX ORDER — SODIUM CHLORIDE 0.9 % (FLUSH) 0.9 %
5-40 SYRINGE (ML) INJECTION EVERY 12 HOURS SCHEDULED
Status: DISCONTINUED | OUTPATIENT
Start: 2025-01-27 | End: 2025-01-28

## 2025-01-28 ENCOUNTER — ANESTHESIA EVENT (OUTPATIENT)
Dept: LABOR AND DELIVERY | Age: 35
DRG: 540 | End: 2025-01-28
Payer: MEDICAID

## 2025-01-28 ENCOUNTER — ANESTHESIA (OUTPATIENT)
Dept: LABOR AND DELIVERY | Age: 35
DRG: 540 | End: 2025-01-28
Payer: MEDICAID

## 2025-01-28 PROBLEM — Z98.891 STATUS POST REPEAT LOW TRANSVERSE CESAREAN SECTION: Status: ACTIVE | Noted: 2025-01-28

## 2025-01-28 PROCEDURE — 6360000002 HC RX W HCPCS

## 2025-01-28 PROCEDURE — 2500000003 HC RX 250 WO HCPCS

## 2025-01-28 PROCEDURE — 7100000001 HC PACU RECOVERY - ADDTL 15 MIN: Performed by: OBSTETRICS & GYNECOLOGY

## 2025-01-28 PROCEDURE — 3700000001 HC ADD 15 MINUTES (ANESTHESIA): Performed by: OBSTETRICS & GYNECOLOGY

## 2025-01-28 PROCEDURE — 3700000000 HC ANESTHESIA ATTENDED CARE: Performed by: OBSTETRICS & GYNECOLOGY

## 2025-01-28 PROCEDURE — 6360000002 HC RX W HCPCS: Performed by: ANESTHESIOLOGY

## 2025-01-28 PROCEDURE — 2580000003 HC RX 258

## 2025-01-28 PROCEDURE — 6370000000 HC RX 637 (ALT 250 FOR IP)

## 2025-01-28 PROCEDURE — 10907ZC DRAINAGE OF AMNIOTIC FLUID, THERAPEUTIC FROM PRODUCTS OF CONCEPTION, VIA NATURAL OR ARTIFICIAL OPENING: ICD-10-PCS | Performed by: OBSTETRICS & GYNECOLOGY

## 2025-01-28 PROCEDURE — 88307 TISSUE EXAM BY PATHOLOGIST: CPT

## 2025-01-28 PROCEDURE — 6360000002 HC RX W HCPCS: Performed by: STUDENT IN AN ORGANIZED HEALTH CARE EDUCATION/TRAINING PROGRAM

## 2025-01-28 PROCEDURE — 1220000000 HC SEMI PRIVATE OB R&B

## 2025-01-28 PROCEDURE — 2720000010 HC SURG SUPPLY STERILE: Performed by: OBSTETRICS & GYNECOLOGY

## 2025-01-28 PROCEDURE — 7100000000 HC PACU RECOVERY - FIRST 15 MIN: Performed by: OBSTETRICS & GYNECOLOGY

## 2025-01-28 PROCEDURE — 2709999900 HC NON-CHARGEABLE SUPPLY: Performed by: OBSTETRICS & GYNECOLOGY

## 2025-01-28 PROCEDURE — 3609079900 HC CESAREAN SECTION: Performed by: OBSTETRICS & GYNECOLOGY

## 2025-01-28 RX ORDER — CEPHALEXIN 500 MG/1
500 CAPSULE ORAL EVERY 8 HOURS SCHEDULED
Status: COMPLETED | OUTPATIENT
Start: 2025-01-28 | End: 2025-01-30

## 2025-01-28 RX ORDER — METRONIDAZOLE 500 MG/1
500 TABLET ORAL EVERY 8 HOURS SCHEDULED
Status: COMPLETED | OUTPATIENT
Start: 2025-01-28 | End: 2025-01-30

## 2025-01-28 RX ORDER — BUPIVACAINE HYDROCHLORIDE 7.5 MG/ML
INJECTION, SOLUTION INTRASPINAL
Status: COMPLETED | OUTPATIENT
Start: 2025-01-28 | End: 2025-01-28

## 2025-01-28 RX ORDER — NALBUPHINE HYDROCHLORIDE 10 MG/ML
5 INJECTION INTRAMUSCULAR; INTRAVENOUS; SUBCUTANEOUS EVERY 4 HOURS PRN
Status: DISCONTINUED | OUTPATIENT
Start: 2025-01-28 | End: 2025-01-30 | Stop reason: HOSPADM

## 2025-01-28 RX ORDER — ACETAMINOPHEN 500 MG
1000 TABLET ORAL EVERY 6 HOURS SCHEDULED
Status: DISCONTINUED | OUTPATIENT
Start: 2025-01-28 | End: 2025-01-30 | Stop reason: HOSPADM

## 2025-01-28 RX ORDER — CITRIC ACID/SODIUM CITRATE 334-500MG
30 SOLUTION, ORAL ORAL ONCE
Status: COMPLETED | OUTPATIENT
Start: 2025-01-28 | End: 2025-01-28

## 2025-01-28 RX ORDER — SODIUM CHLORIDE 0.9 % (FLUSH) 0.9 %
5-40 SYRINGE (ML) INJECTION EVERY 12 HOURS SCHEDULED
Status: DISCONTINUED | OUTPATIENT
Start: 2025-01-28 | End: 2025-01-30 | Stop reason: HOSPADM

## 2025-01-28 RX ORDER — DEXAMETHASONE SODIUM PHOSPHATE 10 MG/ML
INJECTION, SOLUTION INTRAMUSCULAR; INTRAVENOUS
Status: DISCONTINUED | OUTPATIENT
Start: 2025-01-28 | End: 2025-01-28 | Stop reason: SDUPTHER

## 2025-01-28 RX ORDER — ONDANSETRON 2 MG/ML
4 INJECTION INTRAMUSCULAR; INTRAVENOUS EVERY 6 HOURS PRN
Status: DISCONTINUED | OUTPATIENT
Start: 2025-01-28 | End: 2025-01-28 | Stop reason: SDUPTHER

## 2025-01-28 RX ORDER — SODIUM CHLORIDE 0.9 % (FLUSH) 0.9 %
5-40 SYRINGE (ML) INJECTION PRN
Status: DISCONTINUED | OUTPATIENT
Start: 2025-01-28 | End: 2025-01-30 | Stop reason: HOSPADM

## 2025-01-28 RX ORDER — NALBUPHINE HYDROCHLORIDE 10 MG/ML
10 INJECTION INTRAMUSCULAR; INTRAVENOUS; SUBCUTANEOUS ONCE
Status: COMPLETED | OUTPATIENT
Start: 2025-01-28 | End: 2025-01-28

## 2025-01-28 RX ORDER — PHENYLEPHRINE HCL IN 0.9% NACL 1 MG/10 ML
SYRINGE (ML) INTRAVENOUS
Status: DISCONTINUED | OUTPATIENT
Start: 2025-01-28 | End: 2025-01-28 | Stop reason: SDUPTHER

## 2025-01-28 RX ORDER — OXYCODONE HYDROCHLORIDE 5 MG/1
10 TABLET ORAL EVERY 4 HOURS PRN
Status: DISCONTINUED | OUTPATIENT
Start: 2025-01-28 | End: 2025-01-30 | Stop reason: HOSPADM

## 2025-01-28 RX ORDER — SODIUM CHLORIDE, SODIUM LACTATE, POTASSIUM CHLORIDE, AND CALCIUM CHLORIDE .6; .31; .03; .02 G/100ML; G/100ML; G/100ML; G/100ML
500 INJECTION, SOLUTION INTRAVENOUS PRN
Status: DISCONTINUED | OUTPATIENT
Start: 2025-01-28 | End: 2025-01-30 | Stop reason: HOSPADM

## 2025-01-28 RX ORDER — TRANEXAMIC ACID 10 MG/ML
1000 INJECTION, SOLUTION INTRAVENOUS ONCE
Status: COMPLETED | OUTPATIENT
Start: 2025-01-28 | End: 2025-01-28

## 2025-01-28 RX ORDER — LANOLIN
CREAM (ML) TOPICAL
Status: DISCONTINUED | OUTPATIENT
Start: 2025-01-28 | End: 2025-01-30 | Stop reason: HOSPADM

## 2025-01-28 RX ORDER — MORPHINE SULFATE 1 MG/ML
INJECTION, SOLUTION EPIDURAL; INTRATHECAL; INTRAVENOUS
Status: DISCONTINUED | OUTPATIENT
Start: 2025-01-28 | End: 2025-01-28 | Stop reason: SDUPTHER

## 2025-01-28 RX ORDER — NALOXONE HYDROCHLORIDE 0.4 MG/ML
INJECTION, SOLUTION INTRAMUSCULAR; INTRAVENOUS; SUBCUTANEOUS PRN
Status: DISCONTINUED | OUTPATIENT
Start: 2025-01-28 | End: 2025-01-28

## 2025-01-28 RX ORDER — SODIUM CHLORIDE 9 MG/ML
INJECTION, SOLUTION INTRAVENOUS PRN
Status: DISCONTINUED | OUTPATIENT
Start: 2025-01-28 | End: 2025-01-30 | Stop reason: HOSPADM

## 2025-01-28 RX ORDER — ACETAMINOPHEN 500 MG
1000 TABLET ORAL EVERY 6 HOURS
Qty: 60 TABLET | Refills: 1 | Status: SHIPPED | OUTPATIENT
Start: 2025-01-28

## 2025-01-28 RX ORDER — ROPIVACAINE HYDROCHLORIDE 2 MG/ML
INJECTION, SOLUTION EPIDURAL; INFILTRATION; PERINEURAL
Status: DISCONTINUED
Start: 2025-01-28 | End: 2025-01-28

## 2025-01-28 RX ORDER — ACETAMINOPHEN 500 MG
1000 TABLET ORAL ONCE
Status: DISCONTINUED | OUTPATIENT
Start: 2025-01-28 | End: 2025-01-30 | Stop reason: HOSPADM

## 2025-01-28 RX ORDER — OXYCODONE HYDROCHLORIDE 5 MG/1
5 TABLET ORAL EVERY 6 HOURS PRN
Qty: 18 TABLET | Refills: 0 | Status: SHIPPED | OUTPATIENT
Start: 2025-01-28 | End: 2025-02-02

## 2025-01-28 RX ORDER — VITAMIN A, ASCORBIC ACID, CHOLECALCIFEROL, .ALPHA.-TOCOPHEROL ACETATE, DL-, THIAMINE MONONITRATE, RIBOFLAVIN, NIACINAMIDE, PYRIDOXINE HYDROCHLORIDE, FOLIC ACID, CYANOCOBALAMIN, CALCIUM CARBONATE, IRON, ZINC OXIDE, AND CUPRIC OXIDE 4000; 120; 400; 22; 1.84; 3; 20; 10; 1; 12; 200; 29; 25; 2 [IU]/1; MG/1; [IU]/1; [IU]/1; MG/1; MG/1; MG/1; MG/1; MG/1; UG/1; MG/1; MG/1; MG/1; MG/1
1 TABLET ORAL DAILY
Status: DISCONTINUED | OUTPATIENT
Start: 2025-01-28 | End: 2025-01-30 | Stop reason: HOSPADM

## 2025-01-28 RX ORDER — IBUPROFEN 600 MG/1
600 TABLET, FILM COATED ORAL EVERY 6 HOURS SCHEDULED
Status: DISCONTINUED | OUTPATIENT
Start: 2025-01-29 | End: 2025-01-30 | Stop reason: HOSPADM

## 2025-01-28 RX ORDER — OXYCODONE HYDROCHLORIDE 5 MG/1
5 TABLET ORAL EVERY 4 HOURS PRN
Status: DISCONTINUED | OUTPATIENT
Start: 2025-01-28 | End: 2025-01-30 | Stop reason: HOSPADM

## 2025-01-28 RX ORDER — POLYETHYLENE GLYCOL 3350 17 G/17G
17 POWDER, FOR SOLUTION ORAL DAILY PRN
Status: DISCONTINUED | OUTPATIENT
Start: 2025-01-28 | End: 2025-01-30 | Stop reason: HOSPADM

## 2025-01-28 RX ORDER — EPHEDRINE SULFATE/0.9% NACL/PF 25 MG/5 ML
10 SYRINGE (ML) INTRAVENOUS
Status: DISCONTINUED | OUTPATIENT
Start: 2025-01-28 | End: 2025-01-28

## 2025-01-28 RX ORDER — EPHEDRINE SULFATE/0.9% NACL/PF 25 MG/5 ML
SYRINGE (ML) INTRAVENOUS
Status: DISCONTINUED | OUTPATIENT
Start: 2025-01-28 | End: 2025-01-28 | Stop reason: SDUPTHER

## 2025-01-28 RX ORDER — ONDANSETRON 2 MG/ML
INJECTION INTRAMUSCULAR; INTRAVENOUS
Status: DISCONTINUED | OUTPATIENT
Start: 2025-01-28 | End: 2025-01-28 | Stop reason: SDUPTHER

## 2025-01-28 RX ORDER — SODIUM CHLORIDE 9 MG/ML
INJECTION, SOLUTION INTRAVENOUS CONTINUOUS
Status: DISCONTINUED | OUTPATIENT
Start: 2025-01-28 | End: 2025-01-29

## 2025-01-28 RX ORDER — SENNA AND DOCUSATE SODIUM 50; 8.6 MG/1; MG/1
2 TABLET, FILM COATED ORAL DAILY
Qty: 60 TABLET | Refills: 1 | Status: SHIPPED | OUTPATIENT
Start: 2025-01-28

## 2025-01-28 RX ORDER — IBUPROFEN 600 MG/1
600 TABLET, FILM COATED ORAL EVERY 6 HOURS
Qty: 30 TABLET | Refills: 1 | Status: SHIPPED | OUTPATIENT
Start: 2025-01-28

## 2025-01-28 RX ORDER — ENOXAPARIN SODIUM 100 MG/ML
0.5 INJECTION SUBCUTANEOUS DAILY
Status: DISCONTINUED | OUTPATIENT
Start: 2025-01-29 | End: 2025-01-30 | Stop reason: HOSPADM

## 2025-01-28 RX ORDER — ONDANSETRON 4 MG/1
4 TABLET, ORALLY DISINTEGRATING ORAL EVERY 8 HOURS PRN
Status: DISCONTINUED | OUTPATIENT
Start: 2025-01-28 | End: 2025-01-30 | Stop reason: HOSPADM

## 2025-01-28 RX ORDER — ONDANSETRON 2 MG/ML
4 INJECTION INTRAMUSCULAR; INTRAVENOUS EVERY 6 HOURS PRN
Status: DISCONTINUED | OUTPATIENT
Start: 2025-01-28 | End: 2025-01-30 | Stop reason: HOSPADM

## 2025-01-28 RX ORDER — ONDANSETRON 2 MG/ML
4 INJECTION INTRAMUSCULAR; INTRAVENOUS EVERY 6 HOURS PRN
Status: DISCONTINUED | OUTPATIENT
Start: 2025-01-28 | End: 2025-01-28

## 2025-01-28 RX ORDER — SIMETHICONE 80 MG
80 TABLET,CHEWABLE ORAL 4 TIMES DAILY PRN
Qty: 30 TABLET | Refills: 0 | Status: SHIPPED | OUTPATIENT
Start: 2025-01-28

## 2025-01-28 RX ORDER — SENNA AND DOCUSATE SODIUM 50; 8.6 MG/1; MG/1
2 TABLET, FILM COATED ORAL 2 TIMES DAILY
Status: DISCONTINUED | OUTPATIENT
Start: 2025-01-28 | End: 2025-01-30 | Stop reason: HOSPADM

## 2025-01-28 RX ORDER — KETOROLAC TROMETHAMINE 30 MG/ML
30 INJECTION, SOLUTION INTRAMUSCULAR; INTRAVENOUS EVERY 6 HOURS
Status: COMPLETED | OUTPATIENT
Start: 2025-01-28 | End: 2025-01-29

## 2025-01-28 RX ORDER — ONDANSETRON 4 MG/1
4 TABLET, ORALLY DISINTEGRATING ORAL EVERY 6 HOURS PRN
Status: DISCONTINUED | OUTPATIENT
Start: 2025-01-28 | End: 2025-01-28 | Stop reason: SDUPTHER

## 2025-01-28 RX ORDER — SIMETHICONE 80 MG
80 TABLET,CHEWABLE ORAL EVERY 6 HOURS PRN
Status: DISCONTINUED | OUTPATIENT
Start: 2025-01-28 | End: 2025-01-30 | Stop reason: HOSPADM

## 2025-01-28 RX ORDER — EPHEDRINE SULFATE/0.9% NACL/PF 25 MG/5 ML
5 SYRINGE (ML) INTRAVENOUS PRN
Status: DISCONTINUED | OUTPATIENT
Start: 2025-01-29 | End: 2025-01-28

## 2025-01-28 RX ADMIN — BUPIVACAINE HYDROCHLORIDE IN DEXTROSE 13.5 MG: 7.5 INJECTION, SOLUTION SUBARACHNOID at 15:04

## 2025-01-28 RX ADMIN — KETOROLAC TROMETHAMINE 30 MG: 30 INJECTION, SOLUTION INTRAMUSCULAR; INTRAVENOUS at 17:56

## 2025-01-28 RX ADMIN — METRONIDAZOLE 500 MG: 500 TABLET ORAL at 22:15

## 2025-01-28 RX ADMIN — CEPHALEXIN 500 MG: 500 CAPSULE ORAL at 22:15

## 2025-01-28 RX ADMIN — EPHEDRINE SULFATE 5 MG: 5 INJECTION INTRAVENOUS at 15:49

## 2025-01-28 RX ADMIN — Medication 87.3 MILLI-UNITS/MIN: at 16:30

## 2025-01-28 RX ADMIN — SODIUM CHLORIDE, POTASSIUM CHLORIDE, SODIUM LACTATE AND CALCIUM CHLORIDE: 600; 310; 30; 20 INJECTION, SOLUTION INTRAVENOUS at 08:38

## 2025-01-28 RX ADMIN — SODIUM CHLORIDE, POTASSIUM CHLORIDE, SODIUM LACTATE AND CALCIUM CHLORIDE: 600; 310; 30; 20 INJECTION, SOLUTION INTRAVENOUS at 15:57

## 2025-01-28 RX ADMIN — PHENYLEPHRINE HYDROCHLORIDE 25 MCG/MIN: 10 INJECTION INTRAVENOUS at 15:05

## 2025-01-28 RX ADMIN — MORPHINE SULFATE 0.2 MG: 1 INJECTION, SOLUTION EPIDURAL; INTRATHECAL; INTRAVENOUS at 15:04

## 2025-01-28 RX ADMIN — DEXAMETHASONE SODIUM PHOSPHATE 10 MG: 10 INJECTION, SOLUTION INTRAMUSCULAR; INTRAVENOUS at 15:35

## 2025-01-28 RX ADMIN — Medication 909 ML/HR: at 15:27

## 2025-01-28 RX ADMIN — Medication 3000 MG: at 14:29

## 2025-01-28 RX ADMIN — TRANEXAMIC ACID 1000 MG: 10 INJECTION, SOLUTION INTRAVENOUS at 14:56

## 2025-01-28 RX ADMIN — Medication 2.5 MILLION UNITS: at 12:02

## 2025-01-28 RX ADMIN — SENNOSIDES AND DOCUSATE SODIUM 2 TABLET: 50; 8.6 TABLET ORAL at 22:15

## 2025-01-28 RX ADMIN — SODIUM CHLORIDE, PRESERVATIVE FREE 20 MG: 5 INJECTION INTRAVENOUS at 14:35

## 2025-01-28 RX ADMIN — Medication 2.5 MILLION UNITS: at 04:36

## 2025-01-28 RX ADMIN — PENICILLIN G POTASSIUM 5 MILLION UNITS: 5000000 INJECTION, POWDER, FOR SOLUTION INTRAMUSCULAR; INTRAVENOUS at 00:02

## 2025-01-28 RX ADMIN — Medication 2.5 MILLION UNITS: at 07:51

## 2025-01-28 RX ADMIN — EPHEDRINE SULFATE 10 MG: 5 INJECTION INTRAVENOUS at 15:42

## 2025-01-28 RX ADMIN — AZITHROMYCIN MONOHYDRATE 500 MG: 500 INJECTION, POWDER, LYOPHILIZED, FOR SOLUTION INTRAVENOUS at 14:51

## 2025-01-28 RX ADMIN — Medication 10 MG: at 07:26

## 2025-01-28 RX ADMIN — Medication 1 MILLI-UNITS/MIN: at 08:42

## 2025-01-28 RX ADMIN — ACETAMINOPHEN 1000 MG: 500 TABLET ORAL at 17:57

## 2025-01-28 RX ADMIN — Medication 100 MCG: at 15:09

## 2025-01-28 RX ADMIN — ONDANSETRON 4 MG: 2 INJECTION INTRAMUSCULAR; INTRAVENOUS at 14:49

## 2025-01-28 RX ADMIN — SODIUM CHLORIDE, POTASSIUM CHLORIDE, SODIUM LACTATE AND CALCIUM CHLORIDE: 600; 310; 30; 20 INJECTION, SOLUTION INTRAVENOUS at 13:56

## 2025-01-28 RX ADMIN — SODIUM CITRATE AND CITRIC ACID MONOHYDRATE 30 ML: 500; 334 SOLUTION ORAL at 14:31

## 2025-01-28 RX ADMIN — SODIUM CHLORIDE, POTASSIUM CHLORIDE, SODIUM LACTATE AND CALCIUM CHLORIDE: 600; 310; 30; 20 INJECTION, SOLUTION INTRAVENOUS at 00:02

## 2025-01-28 RX ADMIN — SODIUM CHLORIDE: 9 INJECTION, SOLUTION INTRAVENOUS at 16:32

## 2025-01-28 ASSESSMENT — PAIN DESCRIPTION - DESCRIPTORS
DESCRIPTORS: ACHING
DESCRIPTORS: ACHING

## 2025-01-28 ASSESSMENT — PAIN DESCRIPTION - LOCATION
LOCATION: ABDOMEN
LOCATION: ABDOMEN

## 2025-01-28 ASSESSMENT — PAIN SCALES - GENERAL
PAINLEVEL_OUTOF10: 5
PAINLEVEL_OUTOF10: 6

## 2025-01-28 NOTE — ANESTHESIA PRE PROCEDURE
Department of Anesthesiology  Preprocedure Note       Name:  Danni Cole   Age:  34 y.o.  :  1990                                          MRN:  [id number]         Date:  2025      Surgeon: * No surgeons listed *    Procedure: * No procedures listed *    Medications prior to admission:   Prior to Admission medications    Medication Sig Start Date End Date Taking? Authorizing Provider   Lancets MISC 1 each by Does not apply route 2 times daily 24   Karolina Miguel APRN - CNP   glucose monitoring kit 1 kit by Does not apply route 4 times daily Check sugars 4 times a day 24   Karolina Miguel APRN - CNP   blood glucose monitor strips Test 4 times a day & as needed for symptoms of irregular blood glucose. Dispense sufficient amount for indicated testing frequency plus additional to accommodate PRN testing needs. 24   Karolina Miguel APRN - CNP   FreeStyle Lancets MISC 1 each by Does not apply route 4 times daily 24   Karolina Miguel APRN - CNP   Multiple Vitamins-Minerals (MULTIPLE VITAMINS/WOMENS PO) Take 15 mLs by mouth daily Gaby Bates's organic multivitamin    Provider, MD Karo       Current medications:    Current Facility-Administered Medications   Medication Dose Route Frequency Provider Last Rate Last Admin    oxytocin (PITOCIN) 30 units in 500 mL infusion  1-20 monico-units/min IntraVENous Continuous Delmi Rodriguez DO 2 mL/hr at 25 1010 2 monico-units/min at 25 1010    ROPivacaine (NAROPIN) 0.2% injection 0.2%             naloxone 0.4 mg in 10 mL sodium chloride syringe   IntraVENous PRN Keith Simms MD        ondansetron (ZOFRAN) injection 4 mg  4 mg IntraVENous Q6H PRN Keith Simms MD        ePHEDrine injection 10 mg  10 mg IntraVENous Once PRN Keith Simms MD        Followed by    [START ON 2025] ePHEDrine injection 5 mg  5 mg IntraVENous PRN Keith Simms MD        ROPivacaine 0.2% in sodium chloride 0.9% (OB) epidural 100 mL  10 mL/hr

## 2025-01-28 NOTE — PROGRESS NOTES
Labor Progress Note    Danni Cole is a 34 y.o. female  at 40w3d  The patient was seen and examined.  Patient called out desired for cervical check , her pain is well controlled. She reports fetal movement is present, complains of contractions, denies loss of fluid, denies vaginal bleeding.       Vital Signs:  Vitals:    25 0006 25 0417 25 0601 25 0747   BP: (!) 107/53 (!) 116/91  (!) 116/53   Pulse: 86 (!) 106  99   Resp: 18 16  18   Temp: 98 °F (36.7 °C) 97.6 °F (36.4 °C) 98.1 °F (36.7 °C) 99.1 °F (37.3 °C)   TempSrc: Oral Oral  Oral       FHT: 125bmp, moderate variability, accelerations present, decelerations absent  Contractions: regular, every 3 minutes    Chaperone for Intimate Exam: Chaperone was present for entire exam, Chaperone Name: RITESH Awad  Cervical Exam: 4 cm dilated, 60 effaced, -1 station  Pitocin: @ 0 mu/min    Membranes: Intact  Scalp Electrode in place: absent  Intrauterine Pressure Catheter in Place: absent    Interventions: SVE, AROM     Assessment/Plan:  Danni Cole is a 34 y.o. female  at 40w3d admitted for elective TOLAC    - GBS positive, Pen G for GBS prophylaxis  - VSS, Afebrile  - cEFM/TOCO  - Patient declined Pitocin at this time  - Patient amenable to AROM, clear fluid, will continue to monitor   - Anticipate vaginal delivery  - Continue to monitor            Attending updated and in agreement with plan    Delmi Rodriguez DO  Ob/Gyn Resident  2025, 8:18 AM

## 2025-01-28 NOTE — PROGRESS NOTES
Patient Name: Danni Cole  Patient : 1990  Room/Bed: [facility]0707-  Admission Date/Time: 2025  8:11 PM  MRN #: [id number]  [facility] #: [id number]    Date: 2025  Time: 2:19 PM        Danni Cole is a 34 y.o. female  OB History    Para Term  AB Living   2 1 1 0 0 1   SAB IAB Ectopic Molar Multiple Live Births   0 0 0 0 0 1      # Outcome Date GA Lbr Dada/2nd Weight Sex Type Anes PTL Lv   2 Current            1 Term 2018 40w0d  3.487 kg (7 lb 11 oz) M CS-LTranv   RORY      Birth Comments: [place]       Gestational Age:  40w3d      The patient was seen and examined. The details of her pelvic exam can be found in the EPIC flow section of her EMR. Her pain  is not well controlled. Pt wishes to have a primary  due to no cervical change since AROM. Pt is expected to have a macrosomia infant and had a  prior due to arrest of dilation past 5 cm.  The baby is moving well.    Tracing Review (Date of Tracing):  There Is moderate Variability  Vitals:    25 0747 25 1000 25 1100 25 1300   BP: (!) 116/53 121/70 130/72 122/71   Pulse: 99 87 86 71   Resp: 18 18 18 18   Temp: 99.1 °F (37.3 °C) 98.4 °F (36.9 °C) 98.5 °F (36.9 °C) 98.4 °F (36.9 °C)   TempSrc: Oral Oral Oral Oral     FHT's are 130  The tracing is Category 1 .    Intervention:  None      Membranes Are: Ruptured clear fluid  Scalp Electrode in place: No  Intrauterine Pressure Catheter in Place: No        4 Week Lab Review:  Admission on 2025   Component Date Value Ref Range Status    Blood Bank Sample Expiration 2025,2359   Final    Arm Band Number 2025 [id number]   Final    ABO/Rh 2025 O POSITIVE   Final    Antibody Screen 2025 NEGATIVE   Final    T. pallidum, IgG 2025 NONREACTIVE  NONREACTIVE Final    Comment:       T. pallidum antibodies are not detected.  There is no serological evidence of infection with T. pallidum (early primary syphilis

## 2025-01-28 NOTE — OP NOTE
delivered using gentle traction and fundal massage, (Spontaneously), it was intact and appeared normal.  The uterus was cleared of all clots and debris and was firm and contracted. IV Pitocin was infusing. An outflow tract was confirmed. The uterine incision was closed with running locked sutures of 0 Vicryl, starting at each corner with figure of \"8's\" and moving to the midline.  Excellent hemostasis was observed.  Gutters were cleared of all clots and debris.  The pelvis was irrigated with warm, sterile water.  Uterine incision was reinspected and hemostatic. The uterus, bilateral tubes and ovaries were normal.   The peritoneum was closed with 2-0 vicryl.   The fascia was then reapproximated with running sutures of 0 Vicryl, starting at each corner and moving to the midline. It was checked for any defects and there were none. The subcutaneous tissue was irrigated, any bleeding sites were cauterized. The skin was closed in a subcuticular fashion with 4-0 vicryl, then covered with tincture of benzoin and steri-strips,  It was dressed with Prevena  Sponge, Needle and instrument counts were called for and found to be correct prior to closure of the peritoneum, fascia, and skin layers. The urine was clear in the tubing and the bag at the end of the procedure. The patient received preoperative antibiotics and intraoperative analgesic. EPC's were in place at the beginning of the case. Patient will be returned to her LDRP in stable condition once cleared and evaluated by anesthesia.  See orders.                  Attending's Name: Ceci Medina DO      Physician Completing Document: Ceci Medina DO    Electronically signed by Ceci Medina DO on 1/28/2025 at 4:08 PM

## 2025-01-28 NOTE — ANESTHESIA PROCEDURE NOTES
Spinal Block    Patient location during procedure: OR  End time: 1/28/2025 3:04 PM  Reason for block: primary anesthetic  Staffing  Performed: other anesthesia staff   Anesthesiologist: Shannan Obrien MD  Resident/CRNA: Rabia Cruz APRN - CRNA  Other anesthesia staff: Yonathan Mejia RN  Performed by: Rabia Cruz APRN - CRNA  Authorized by: Keith Simms MD    Spinal Block  Patient position: sitting  Prep: Betadine  Patient monitoring: continuous pulse ox and frequent blood pressure checks  Approach: midline  Location: L3/L4  Guidance: paresthesia technique  Provider prep: mask and sterile gloves  Local infiltration: lidocaine  Needle  Needle type: Reji   Needle gauge: 24 G  Needle length: 4 in  Assessment  Sensory level: T6  Swirl obtained: Yes  CSF: clear  Attempts: 1  Hemodynamics: stable  Preanesthetic Checklist  Completed: patient identified, IV checked, site marked, risks and benefits discussed, surgical/procedural consents, equipment checked, pre-op evaluation, timeout performed, anesthesia consent given, oxygen available, monitors applied/VS acknowledged, fire risk safety assessment completed and verbalized and blood product R/B/A discussed and consented

## 2025-01-28 NOTE — H&P
chart  - CS consent form signed in chart.    - Pt requesting TOLAC at this time.    Given EFW of fetus estimated at > 9lbs, described what a shoulder dystocia is to the patient and discussed that maneuvers are used to deliver the fetus but the shoulder dystocia has risk of brachial plexus injury with the possibility of permanent neurologic damage or injury. Discussed that shoulder dystocia is highly unpredictable and that while diabetes is a risk factor most time shoulder dystocia happens in patients without risk factors and that shoulder dystocia can happen at any weight. Patient had an opportunity to ask questions and verbalized her understanding of this risk.    LGA   - EFW 9#2   - Patient counseled extensively on shoulder dystocia. See counseling above    Asthma   - Clinically asymptomatic    - Stable on no meds at this time    Echogenic Fetal Cardiac Focus   - NIPT wnl    Suspected Fetal VSD   - Seen on fetal echo   - Peds Cards consult in the PP period    Marginal Insertion of Cord   - Seen on MFM scan    Fragile X Carrier   - Intermediate carrier   - FOB carrier screening not seen    BMI 40    Patient Active Problem List    Diagnosis Date Noted    Diet controlled gestational diabetes mellitus (GDM), antepartum 2024     Priority: High     2024 patient declines 1 hour GTT. Desires to check blood sugars four times/ day and be considered gestational diabetic. Declines consultation with diabetic educator and MFM.       BMI 40.0-44.9, adult 2024     Priority: High    Marginal insertion of umbilical cord 2024     Priority: High    echogenic focus seen in left ventricle of fetal heart, suspected fetal cardiac ventricular septal defect 2024     Priority: High    40 weeks gestation of pregnancy 2025    GBS (group B Streptococcus carrier), +RV culture, currently pregnant 2025    H/O:  2024       Plan discussed with Dr. Ferguson, who is agreeable.     Steroids given this

## 2025-01-28 NOTE — DISCHARGE SUMMARY
Obstetric Discharge Summary  [facility]    Patient Name: Danni Cole  Patient : 1990  Primary Care Physician: No primary care provider on file.  Admit Date: 2025    Principal Diagnosis: IUP at 40w2d, admitted for elective TOLAC     Her pregnancy has been complicated by:   Patient Active Problem List   Diagnosis    H/O:     BMI 40.0-44.9, adult    Marginal insertion of umbilical cord    echogenic focus seen in left ventricle of fetal heart, suspected fetal cardiac ventricular septal defect    Diet controlled gestational diabetes mellitus (GDM), antepartum    GBS (group B Streptococcus carrier), +RV culture, currently pregnant    40 weeks gestation of pregnancy    RLTCS 25 M Apg 8/9 Wt 9#11    Anemia affecting second pregnancy    Uterine scar from previous  delivery    Encounter for care or examination of mother immediately after delivery       Infection Present?: No  Hospital Acquired: No    Surgical Operations & Procedures:  Analgesia: spinal  Delivery Type: RLTCS     Consultations: NICU and Anesthesia    Pertinent Findings & Procedures:   Danni Cole is a 34 y.o. female  at 40w2d admitted for elective TOLAC; received Pen G, AROM (clr), Nubain x1, pitocin.    After approximately 11 hours ruptured and 6 hours ruptured on pitocin without cervical change past 4 cm, patient does not wish to continue with trial of labor, but would like to proceed with repeat  section. Orders placed. Patient counseled previously and agreed to proceed to the OR.    She delivered by repeat low transverse  a Live Born infant on 25 .       Information for the patient's :  Chuckie Cole [[id number]]   male   Birth Weight: 4.4 kg (9 lb 11.2 oz)    Apgars: 8 at 1 minute and 9 at 5 minutes.     ERAS for  Section  Received ERAS education outpatient: No  Consumed electrolyte-rich clear fluid prior to surgery: No  Received pre-operative

## 2025-01-28 NOTE — PROGRESS NOTES
Labor Progress Note    Danni Cole is a 34 y.o. female  at 40w3d  The patient was seen and examined. Her pain is not well controlled, but she is coping. She reports fetal movement is present, complains of contractions, complains of loss of fluid, denies vaginal bleeding.       Vital Signs:  Vitals:    25 0601 25 0747 25 1000 25 1100   BP:  (!) 116/53 121/70 130/72   Pulse:  99 87 86   Resp:  18 18 18   Temp: 98.1 °F (36.7 °C) 99.1 °F (37.3 °C) 98.4 °F (36.9 °C) 98.5 °F (36.9 °C)   TempSrc:  Oral Oral Oral         FHT: 130, moderate variability, accelerations present, decelerations absent  Contractions: regular, every 2-3 minutes    Chaperone for Intimate Exam: Chaperone was present for entire exam, Chaperone Name: Lucy AWAD  Cervical Exam: 4-5 cm dilated, 90 effaced, -1 station  Pitocin: @ 2 mu/min    Membranes: Ruptured clear fluid, AROM  Scalp Electrode in place: absent  Intrauterine Pressure Catheter in Place: absent    Interventions: SVE    Assessment/Plan:  Danni Cole is a 34 y.o. female  at 40w3d admitted for eTOLAC   - GBS positive, Pen G for GBS prophylaxis   - VSS, afebrile   - cEFM/TOCO cat 1, regular contractions   - SVE: /-1   - Patient is quite uncomfortable with her contractions and is moving around the room   - She is coping   - AROM (clr)   - Nubain x1   - Continue pitocin per protocol   - Continue to monitor closely    Attending updated and in agreement with plan    Leela Devine MD  Ob/Gyn Resident  2025, 12:25 PM

## 2025-01-28 NOTE — CARE COORDINATION
ANTEPARTUM NOTE    40 weeks gestation of pregnancy [Z3A.40]    Danni was admitted to L&D on 1/27/25 for IOL @ 40w2d    OB GYN Provider: Dr. Turcios    Will meet with patient after delivery to verify name/address/phone/insurance and discuss discharge planning.     Anticipate DC home 2 nights after vaginal delivery or 4 nights after C/S delivery as long as hemodynamically stable.

## 2025-01-28 NOTE — PROGRESS NOTES
Labor Progress Note    Danni Cole is a 34 y.o. female  at 40w3d  The patient was seen and examined. Her pain is not well controlled. Patient complains of contractions. She reports fetal movement is present, denies loss of fluid, denies vaginal bleeding.       Vital Signs:  Vitals:    258 25 0006 25 0417 25 0601   BP: 128/78 (!) 107/53 (!) 116/91    Pulse: 90 86 (!) 106    Resp: 18 18 16    Temp: 98.7 °F (37.1 °C) 98 °F (36.7 °C) 97.6 °F (36.4 °C) 98.1 °F (36.7 °C)   TempSrc: Oral Oral Oral         FHT: 130, moderate variability, accelerations present, decelerations absent  Contractions: irregular, every 4-5 minutes    Chaperone for Intimate Exam: Chaperone was present for entire exam, Chaperone Name: RN  Cervical Exam:   Pitocin: @ 0 mu/min    Membranes: Ruptured clear fluid  Scalp Electrode in place: absent  Intrauterine Pressure Catheter in Place: absent    Interventions: SVE    Assessment/Plan:  Danni Cole is a 34 y.o. female  at 40w3d admitted for elective TOLAC   - GBS positive, Pen G for GBS prophylaxis   - VSS, afebrile   - cEFM/TOCO: CAT 1   - SVE:    - Patient desires Nubain x 1 for pain control    - Patient says she will consider Pitocin after talking it over with her partner.   - Will continue to monitor closely    Attending updated and in agreement with plan    Crissy Araiza DO  Ob/Gyn Resident  2025, 7:01 AM

## 2025-01-28 NOTE — PROGRESS NOTES
Obstetric/Gynecology Resident Interval Note    Patient evaluated. She is feeling more pain with contractions. Repeat SVE reveals no cervical change. Patient has been on pitocin with ruptured membranes for approximately 6 hours without cervical change. R/B/A of TOLAC vs  section again discussed per patient request. Discussed [facility] recommendation for repeat  section (see MFM note) 2/2 low  calculator and previous history. Discussed maternal and fetal risks for TOLAC and CD. Ultimately, the patient would like to proceed with  section at this time.     Risks, benefits and alternatives were discussed extensively with the patient and her family . Patient aware of risks of bleeding, infection, scarring, injury to infant, injury to surrounding pelvic tissues/organs, need for blood/blood products, need for additional surgery including hysterectomy, as well are rare risk for cardiac arrest, thromboembolic event, pneumonia, and maternal or fetal death. All questions were answered. Reassurance given. Consent for  section signed and placed in chart, orders placed and anesthesiology notified.     Repeat  Section, declining to proceed with TOLAC    - Consent signed    - Ancef, Azithro ordered    - TXA x1    - Pepcid, tylenol, bicitra    - NICU notified    - Anesthesia notified    - Proceed to OR when able     Erik Segal MD  OB/GYN Resident, PGY3  [place], [place]  2025, 2:09 PM

## 2025-01-28 NOTE — PROGRESS NOTES
Obstetric/Gynecology Resident Interval Note    Patient evaluated at bedside.  She is resting comfortably in bed, eating a popsicle.  She said she got minimal rest overnight because she had intermittent discomfort because of cramping and contractions and her anxiety related to this induction and labor course.  Had a lengthy discussion about the R/B/A of TOLAC versus repeat  section.  Discussed that she has been ruptured since 300, and if she chooses to continue with TOLAC, we would recommend use of Pitocin to attempt to keep the induction moving.  Discussed the risks of a prolonged rupture and prolonged labor course, including maternal infection and hemorrhage, and fetal intolerance.    After extensive counseling and answer many questions, the patient would like to proceed with TOLAC.  She is amenable to augmentation with Pitocin at this time.  Will continue to monitor closely.    Attending updated and in agreement with plan    Erik Segal MD  OB/GYN Resident, PGY3  [place], [place]  2025, 8:43 AM

## 2025-01-28 NOTE — PROGRESS NOTES
Received pt into my care, pt was admitted for scheduled induction,aware of contractions for past 3 days, denies leaking and bldg, + fetal movement

## 2025-01-29 PROBLEM — O99.019 ANEMIA AFFECTING SECOND PREGNANCY: Status: ACTIVE | Noted: 2025-01-29

## 2025-01-29 PROBLEM — N85.A UTERINE SCAR FROM PREVIOUS CESAREAN DELIVERY: Status: ACTIVE | Noted: 2025-01-29

## 2025-01-29 LAB
HCT VFR BLD AUTO: 30.3 % (ref 36.3–47.1)
HGB BLD-MCNC: 9.7 G/DL (ref 11.9–15.1)

## 2025-01-29 PROCEDURE — 6370000000 HC RX 637 (ALT 250 FOR IP)

## 2025-01-29 PROCEDURE — 2500000003 HC RX 250 WO HCPCS

## 2025-01-29 PROCEDURE — 99024 POSTOP FOLLOW-UP VISIT: CPT | Performed by: OBSTETRICS & GYNECOLOGY

## 2025-01-29 PROCEDURE — 85014 HEMATOCRIT: CPT

## 2025-01-29 PROCEDURE — 1220000000 HC SEMI PRIVATE OB R&B

## 2025-01-29 PROCEDURE — 6360000002 HC RX W HCPCS

## 2025-01-29 PROCEDURE — 36415 COLL VENOUS BLD VENIPUNCTURE: CPT

## 2025-01-29 PROCEDURE — 85018 HEMOGLOBIN: CPT

## 2025-01-29 RX ORDER — FERROUS SULFATE 325(65) MG
325 TABLET, DELAYED RELEASE (ENTERIC COATED) ORAL 2 TIMES DAILY
Qty: 90 TABLET | Refills: 3 | Status: SHIPPED | OUTPATIENT
Start: 2025-01-29

## 2025-01-29 RX ORDER — FERROUS SULFATE 325(65) MG
325 TABLET, DELAYED RELEASE (ENTERIC COATED) ORAL EVERY OTHER DAY
Qty: 90 TABLET | Refills: 1 | Status: SHIPPED | OUTPATIENT
Start: 2025-01-29

## 2025-01-29 RX ADMIN — SIMETHICONE 80 MG: 80 TABLET, CHEWABLE ORAL at 06:09

## 2025-01-29 RX ADMIN — IBUPROFEN 600 MG: 600 TABLET, FILM COATED ORAL at 23:43

## 2025-01-29 RX ADMIN — KETOROLAC TROMETHAMINE 30 MG: 30 INJECTION, SOLUTION INTRAMUSCULAR; INTRAVENOUS at 06:09

## 2025-01-29 RX ADMIN — IBUPROFEN 600 MG: 600 TABLET, FILM COATED ORAL at 18:21

## 2025-01-29 RX ADMIN — METRONIDAZOLE 500 MG: 500 TABLET ORAL at 13:47

## 2025-01-29 RX ADMIN — METRONIDAZOLE 500 MG: 500 TABLET ORAL at 21:42

## 2025-01-29 RX ADMIN — ACETAMINOPHEN 1000 MG: 500 TABLET ORAL at 00:03

## 2025-01-29 RX ADMIN — SIMETHICONE 80 MG: 80 TABLET, CHEWABLE ORAL at 00:03

## 2025-01-29 RX ADMIN — Medication 1 TABLET: at 08:52

## 2025-01-29 RX ADMIN — ACETAMINOPHEN 1000 MG: 500 TABLET ORAL at 06:09

## 2025-01-29 RX ADMIN — ACETAMINOPHEN 1000 MG: 500 TABLET ORAL at 18:20

## 2025-01-29 RX ADMIN — ACETAMINOPHEN 1000 MG: 500 TABLET ORAL at 11:51

## 2025-01-29 RX ADMIN — METRONIDAZOLE 500 MG: 500 TABLET ORAL at 06:09

## 2025-01-29 RX ADMIN — ACETAMINOPHEN 1000 MG: 500 TABLET ORAL at 23:43

## 2025-01-29 RX ADMIN — CEPHALEXIN 500 MG: 500 CAPSULE ORAL at 21:42

## 2025-01-29 RX ADMIN — SODIUM CHLORIDE, PRESERVATIVE FREE 10 ML: 5 INJECTION INTRAVENOUS at 09:01

## 2025-01-29 RX ADMIN — SODIUM CHLORIDE, PRESERVATIVE FREE 10 ML: 5 INJECTION INTRAVENOUS at 20:28

## 2025-01-29 RX ADMIN — SENNOSIDES AND DOCUSATE SODIUM 2 TABLET: 50; 8.6 TABLET ORAL at 08:52

## 2025-01-29 RX ADMIN — CEPHALEXIN 500 MG: 500 CAPSULE ORAL at 06:09

## 2025-01-29 RX ADMIN — CEPHALEXIN 500 MG: 500 CAPSULE ORAL at 13:47

## 2025-01-29 RX ADMIN — KETOROLAC TROMETHAMINE 30 MG: 30 INJECTION, SOLUTION INTRAMUSCULAR; INTRAVENOUS at 11:51

## 2025-01-29 RX ADMIN — SENNOSIDES AND DOCUSATE SODIUM 2 TABLET: 50; 8.6 TABLET ORAL at 20:28

## 2025-01-29 RX ADMIN — KETOROLAC TROMETHAMINE 30 MG: 30 INJECTION, SOLUTION INTRAMUSCULAR; INTRAVENOUS at 00:03

## 2025-01-29 ASSESSMENT — PAIN - FUNCTIONAL ASSESSMENT
PAIN_FUNCTIONAL_ASSESSMENT: ACTIVITIES ARE NOT PREVENTED
PAIN_FUNCTIONAL_ASSESSMENT: ACTIVITIES ARE NOT PREVENTED

## 2025-01-29 ASSESSMENT — PAIN DESCRIPTION - PAIN TYPE: TYPE: SURGICAL PAIN

## 2025-01-29 ASSESSMENT — PAIN DESCRIPTION - ORIENTATION
ORIENTATION: MID;LOWER
ORIENTATION: LOWER;MID
ORIENTATION: MID;LOWER
ORIENTATION: MID;LOWER

## 2025-01-29 ASSESSMENT — PAIN SCALES - GENERAL
PAINLEVEL_OUTOF10: 0
PAINLEVEL_OUTOF10: 1
PAINLEVEL_OUTOF10: 1
PAINLEVEL_OUTOF10: 3
PAINLEVEL_OUTOF10: 2

## 2025-01-29 ASSESSMENT — PAIN DESCRIPTION - DESCRIPTORS
DESCRIPTORS: SORE
DESCRIPTORS: SORE
DESCRIPTORS: DISCOMFORT
DESCRIPTORS: DULL;ACHING

## 2025-01-29 ASSESSMENT — PAIN DESCRIPTION - ONSET: ONSET: ON-GOING

## 2025-01-29 ASSESSMENT — PAIN DESCRIPTION - LOCATION
LOCATION: ABDOMEN
LOCATION: ABDOMEN
LOCATION: INCISION;FOOT
LOCATION: ABDOMEN

## 2025-01-29 ASSESSMENT — PAIN DESCRIPTION - FREQUENCY: FREQUENCY: INTERMITTENT

## 2025-01-29 NOTE — CONSULTS
Packet of breastfeeding information had been left at mom's bedside. She reports her baby is nursing well and comfortably. Reviewed feeding patterns. Encouraged to call out for assistance as needed.

## 2025-01-29 NOTE — CARE COORDINATION
CASE MANAGEMENT POST-PARTUM TRANSITIONAL CARE PLAN    40 weeks gestation of pregnancy [Z3A.40]    OB Provider: Dr. Tam Matias met w/ Danni and her mom at her bedside to discuss DCP. She is S/P CS on 25 @ 40w3d at 1524 of male infant    Writer verified address,her phone number and emergency contacts are all correct on facesheet.. She states she lives with her  and their 7 yr old son. She denied barriers with transportation home, to doctor's appointments or with paying for medications upon discharge home.     [employer] insurance correct. Writer notified her she has 30 days from date of birth to add  to insurance policy by contacting [facility]. She verbalized understanding.    Infant name on BC: Mathew.   Infant PCP Dr. Mcrae.     DME: [employer] supplies  HOME CARE: None    Anticipate DC home of couplet in private vehicle in 2-4 days status post C/S.    [facility] RISK OF UNPLANNED READMISSION 2.0             2.3 Total Score

## 2025-01-29 NOTE — CARE COORDINATION
Social Work     Sw reviewed medical record (current active problem list) and tox screens and found no current concerns.     Sw spoke with mom briefly to explain Sw role, inquire if any needs or concerns, and provide safe sleep education and discuss.  Mom denied any needs or questions and informs baby has a safe sleep environment (bass), mom would liked linked with [facility]'s program for pnp- sw provided mom contact number via text.     Mom denied any current s/s of anxiety or depression and is aware to reach out to OB if any s/s occur after dc.     Mom reports a really good support system and denied any current questions or needs, MGM currently present.      Mom reports this is her 2nd child, she also has a 7 year old son.       [facility]      Sw encouraged mom to reach out if any issues or concerns arise.

## 2025-01-29 NOTE — PROGRESS NOTES
POST OPERATIVE DAY # 1    Danni Cole is a 34 y.o. female   This patient was seen and examined today.     Her pregnancy was complicated by:   Patient Active Problem List   Diagnosis    H/O:     BMI 40.0-44.9, adult    Marginal insertion of umbilical cord    echogenic focus seen in left ventricle of fetal heart, suspected fetal cardiac ventricular septal defect    Diet controlled gestational diabetes mellitus (GDM), antepartum    GBS (group B Streptococcus carrier), +RV culture, currently pregnant    40 weeks gestation of pregnancy    RLTCS 25 M Apg ** Wt **       Today she is doing well without any chief complaint. Her lochia is light. She denies chest pain, shortness of breath, and headache. She is breast feeding and she denies any signs or symptoms of mastitis.  She is ambulating well. She is voiding without through her elias. She currently denies S/S of postpartum depression. Flatus absent.  Bowel movement absent. She is tolerating solids.    Vital Signs:  Vitals:    25 2015 25 2215 25 0003 25 0430   BP: 120/65  (!) 99/56 (!) 95/44   Pulse: 74  80 78   Resp: 18  16 14   Temp: 98.4 °F (36.9 °C)  98.6 °F (37 °C) 98.2 °F (36.8 °C)   TempSrc: Oral  Oral Oral   SpO2: 98% 99% 98% 97%        Urine Input & Output last 24hrs:     Intake/Output Summary (Last 24 hours) at 2025 0538  Last data filed at 2025 0400  Gross per 24 hour   Intake 2922.46 ml   Output 1860 ml   Net 1062.46 ml       Physical Exam:  General:  no apparent distress, alert, and cooperative  Neurologic:  alert, oriented, normal speech, no focal findings or movement disorder noted  Lungs:  No increased work of breathing, good air exchange  Heart:  regular rate    Abdomen: abdomen soft, non-distended, non-tender   Fundus: non-tender, normal size, firm, below umbilicus  Incision: Prevena in place and functioning  Extremities:  no calf tenderness, non edematous    Labs:  Lab Results   Component Value Date

## 2025-01-30 VITALS
HEART RATE: 85 BPM | OXYGEN SATURATION: 99 % | SYSTOLIC BLOOD PRESSURE: 115 MMHG | DIASTOLIC BLOOD PRESSURE: 69 MMHG | RESPIRATION RATE: 16 BRPM | TEMPERATURE: 97.7 F

## 2025-01-30 PROCEDURE — 6370000000 HC RX 637 (ALT 250 FOR IP)

## 2025-01-30 RX ADMIN — METRONIDAZOLE 500 MG: 500 TABLET ORAL at 06:09

## 2025-01-30 RX ADMIN — METRONIDAZOLE 500 MG: 500 TABLET ORAL at 14:49

## 2025-01-30 RX ADMIN — ACETAMINOPHEN 1000 MG: 500 TABLET ORAL at 06:08

## 2025-01-30 RX ADMIN — IBUPROFEN 600 MG: 600 TABLET, FILM COATED ORAL at 14:48

## 2025-01-30 RX ADMIN — Medication 1 TABLET: at 08:11

## 2025-01-30 RX ADMIN — IBUPROFEN 600 MG: 600 TABLET, FILM COATED ORAL at 06:09

## 2025-01-30 RX ADMIN — CEPHALEXIN 500 MG: 500 CAPSULE ORAL at 14:48

## 2025-01-30 RX ADMIN — ACETAMINOPHEN 1000 MG: 500 TABLET ORAL at 14:48

## 2025-01-30 RX ADMIN — CEPHALEXIN 500 MG: 500 CAPSULE ORAL at 06:09

## 2025-01-30 RX ADMIN — SENNOSIDES AND DOCUSATE SODIUM 2 TABLET: 50; 8.6 TABLET ORAL at 08:11

## 2025-01-30 ASSESSMENT — PAIN DESCRIPTION - LOCATION
LOCATION: INCISION
LOCATION: INCISION

## 2025-01-30 ASSESSMENT — PAIN SCALES - GENERAL
PAINLEVEL_OUTOF10: 3
PAINLEVEL_OUTOF10: 4

## 2025-01-30 ASSESSMENT — PAIN DESCRIPTION - DESCRIPTORS
DESCRIPTORS: CRAMPING;DISCOMFORT
DESCRIPTORS: DISCOMFORT

## 2025-01-30 ASSESSMENT — PAIN DESCRIPTION - ORIENTATION
ORIENTATION: MID;LOWER
ORIENTATION: LOWER

## 2025-01-30 ASSESSMENT — PAIN - FUNCTIONAL ASSESSMENT: PAIN_FUNCTIONAL_ASSESSMENT: ACTIVITIES ARE NOT PREVENTED

## 2025-01-30 NOTE — FLOWSHEET NOTE
Pt discharged to private residence via wheelchair in stable condition with belongings  Discharge instructions given  \"Meds To Beds\" medication at bedside  Pt denies having any further questions at this time  personal items given to patient at discharge  Patient/family state they have everything they were admitted with.   hibiclens sent home with patient

## 2025-01-30 NOTE — CARE COORDINATION
Discharge Report    [facility]  Clinical Case Management Department  Written by: JIHAN BELTRE RN    Patient Name: Danni Cole  Attending Provider: Ceci Medina*  Admit Date: 2025  8:11 PM  MRN: [id number]  Account: [id number]                     : 1990  Discharge Date: 25      Disposition: home    JIHAN BELTRE RN

## 2025-01-30 NOTE — DISCHARGE INSTRUCTIONS
crib on their back and take a break.  NEVER shake your infant.      BLEEDING  Vaginal bleeding will decrease in amount over the next few weeks.  You will notice that as your activity increases, your flow may increase.  This is your body's way of telling you, you need take things easier and rest more often.  Call your OB/ER if you are saturating one maxi pad in an hour & passing large clots.    BREAST CARE  Take medications as recommended by your doctor or midwife for pain  If you develop a warm, red, tender area on your breast or develop a fever contact your lactation consultant.         For breastfeeding moms:  If you become engorged, feeding may be more difficult or painful for 1-2 days.  You may find it helpful to hand express some milk so that the infant can latch on more easily.   While breastfeeding, continue to take your prenatal vitamins as directed by your doctor or midwife.   Refer to the breastfeeding booklet in the  folder/binder for more information.  For any questions or concerns contact a Lactation Consultant.  Leave a message and your call will be returned.    For NON-breastfeeding moms:  You may apply ice packs to your breasts over you bra for twenty minutes at a time for comfort. Cabbage leaves may be applied to breasts, replace when wilted.  Avoid stimulation to your breasts, when showering allow the water to strike your back not your breasts.  Do not express milk or your body will make more.  Wear a good fitting bra until your milk dries, such as a sports bra.    INCISIONAL CARE / DOLORES CARE  Shower daily, cleansing incision and perineum with mild soap.  After shower pat the incision area dry and allow the area open to air.   To keep the incision dry, and if necessary, you may dry it with a hair dryer on the cool setting.  If used, Steri-stipes should fall off by 2 weeks.  If not please remove them when you are in the shower.  Do not briskly pull at strips.  If used, Staples should be

## 2025-01-30 NOTE — ANESTHESIA POSTPROCEDURE EVALUATION
Department of Anesthesiology  Postprocedure Note    Patient: Danni Cole  MRN: [id number]  YOB: 1990  Date of evaluation: 2025    Procedure Summary       Date: 25 Room / Location: [facility] OR [address] [place] [address]    Anesthesia Start: 1447 Anesthesia Stop: 1629    Procedures:        SECTION      Labor Analgesia Diagnosis:       History of       (History of  [Z98.891])    Surgeons: Ceci Medina DO Responsible Provider: Shannan Obrien MD    Anesthesia Type: epidural ASA Status: 2            Anesthesia Type: No value filed.    Rosie Phase I: Rosie Score: 10    Rosie Phase II:      Anesthesia Post Evaluation    Patient location during evaluation: bedside  Patient participation: complete - patient participated  Level of consciousness: awake and awake and alert  Pain score: 1  Airway patency: patent  Nausea & Vomiting: no nausea and no vomiting  Cardiovascular status: blood pressure returned to baseline and hemodynamically stable  Respiratory status: acceptable  Hydration status: euvolemic  Pain management: adequate        No notable events documented.

## 2025-01-30 NOTE — PROGRESS NOTES
POST OPERATIVE DAY # 2    Danni Cole is a 34 y.o. female   This patient was seen and examined today. RLTCS on 25     Her pregnancy was complicated by:   Patient Active Problem List   Diagnosis    H/O:     BMI 40.0-44.9, adult    Marginal insertion of umbilical cord    echogenic focus seen in left ventricle of fetal heart, suspected fetal cardiac ventricular septal defect    Diet controlled gestational diabetes mellitus (GDM), antepartum    GBS (group B Streptococcus carrier), +RV culture, currently pregnant    40 weeks gestation of pregnancy    RLTCS 25 M Apg ** Wt **    Anemia affecting second pregnancy    Uterine scar from previous  delivery    Encounter for care or examination of mother immediately after delivery       Today she is doing well without any chief complaint. Her lochia is light. She denies chest pain, shortness of breath, headache, lightheadedness, blurred vision and peripheral edema. She is breast feeding and she denies any signs or symptoms of mastitis.  She is ambulating well. She is voiding without difficulty. She currently denies S/S of postpartum depression. Flatus present.  Bowel movement absent. She is tolerating solids.    Vital Signs:  Vitals:    25 0859 25 1549 25 2030 25 0015   BP: 97/83 (!) 95/58 111/73 (!) 94/50   Pulse: 85 80 82 70   Resp: 16 16 18 18   Temp: 97.9 °F (36.6 °C) 98.2 °F (36.8 °C) 98.1 °F (36.7 °C) 97.5 °F (36.4 °C)   TempSrc: Oral Oral Oral Oral   SpO2: 98% 99% 98% 100%         Urine Input & Output last 24hrs:     Intake/Output Summary (Last 24 hours) at 2025 0708  Last data filed at 2025 1125  Gross per 24 hour   Intake --   Output 150 ml   Net -150 ml       Physical Exam:  General:  no apparent distress, alert and cooperative  Neurologic:  alert, oriented, normal speech, no focal findings or movement disorder noted  Lungs:  No increased work of breathing or conversational dyspnea   Heart:  Regular

## 2025-01-30 NOTE — PROGRESS NOTES
CLINICAL PHARMACY NOTE: MEDS TO BEDS    Total # of Prescriptions Filled: 6   The following medications were delivered to the patient:  IBU 600MG  GAS RELIEF 80MG  IRON 325MG  SENEXON   OXY 5MG   TYLENOL 500MG     Additional Documentation:

## 2025-01-30 NOTE — LACTATION NOTE
Pt states baby is nursing well, notes at times he struggles opening wide. Encouraged to get baby prone in laid back position and call out for position assist when baby arouses. Reviewed discharge instructions.

## 2025-01-30 NOTE — FLOWSHEET NOTE
I have reviewed all [facility] Post-Birth Warning Signs and essential teaching points for pulmonary embolism, cardiac disease, hypertensive disorders of pregnancy, obstetric hemorrhage, venous thromboembolism, infection, and postpartum depression with the patient . I have informed the patient on when to call their healthcare provider and when to call 911. I have discussed with the patient  the importance of scheduling a follow-up visit with their physician, nurse practitioner or midwife and provided them with correct contact information for appointment. I have provided the patient with a copy of the \"Save Your Life\" handout. The patient has acknowledged receiving and understanding this education with her signature.

## 2025-01-31 LAB — SURGICAL PATHOLOGY REPORT: NORMAL

## 2025-02-04 ENCOUNTER — OFFICE VISIT (OUTPATIENT)
Dept: OBGYN CLINIC | Age: 35
End: 2025-02-04

## 2025-02-04 VITALS
HEIGHT: 64 IN | DIASTOLIC BLOOD PRESSURE: 72 MMHG | SYSTOLIC BLOOD PRESSURE: 118 MMHG | WEIGHT: 283 LBS | BODY MASS INDEX: 48.32 KG/M2

## 2025-02-04 PROBLEM — Z3A.40 40 WEEKS GESTATION OF PREGNANCY: Status: RESOLVED | Noted: 2025-01-27 | Resolved: 2025-02-04

## 2025-02-04 PROBLEM — N85.A UTERINE SCAR FROM PREVIOUS CESAREAN DELIVERY: Status: RESOLVED | Noted: 2025-01-29 | Resolved: 2025-02-04

## 2025-02-04 PROBLEM — O43.199 MARGINAL INSERTION OF UMBILICAL CORD AFFECTING MANAGEMENT OF MOTHER: Status: RESOLVED | Noted: 2024-09-16 | Resolved: 2025-02-04

## 2025-02-04 PROBLEM — O99.019 ANEMIA AFFECTING SECOND PREGNANCY: Status: RESOLVED | Noted: 2025-01-29 | Resolved: 2025-02-04

## 2025-02-04 PROBLEM — O28.3 ABNORMAL FETAL ULTRASOUND: Status: RESOLVED | Noted: 2024-09-16 | Resolved: 2025-02-04

## 2025-02-04 PROBLEM — O99.820 GBS (GROUP B STREPTOCOCCUS CARRIER), +RV CULTURE, CURRENTLY PREGNANT: Status: RESOLVED | Noted: 2025-01-02 | Resolved: 2025-02-04

## 2025-02-04 PROCEDURE — 99024 POSTOP FOLLOW-UP VISIT: CPT | Performed by: NURSE PRACTITIONER

## 2025-02-04 NOTE — PROGRESS NOTES
Pt presented to office for YOU removal.  YOU removed without difficulty or discomfort to patient.  Pt incision clean and clear of redness/drainage.  Pt instructed on incisional care. Pt had additional concerns so pt added to Karolina's schedule to address pt concerns.  Pt scheduled for PP follow up in office.

## 2025-02-04 NOTE — PROGRESS NOTES
Danni Cole  1:36 PM  25            The patient was seen. She has no chief complaints today. She delivered by  section on 2025. She is  breast feeding and there is not any signs or symptoms of mastitis.  The patient completed the E.P.D.S. Evaluation form and scored NA- will complete screening at 2 week PP visit.  She does not have any signs or symptoms of post partum depression. She denies any suicidal thoughts with a plan, intent to harm others, and delusional ideas. Today her lochia is light she denies any dizziness or shortness of breath.  Patient reports 2 days ago had pain in collar bone area that spread down to her chest area. Pain felt worse when she took deep breaths. Reports pain lasted 5 minutes and went away. Patient reports having this same pain yesterday but didn't last as long, maybe a couple of minutes. Denies chest pain, SOB or difficulty breathing. Denies fever. Patient instructed if pain reoccurs to call office or go to ER with symptoms.     Her pregnancy was complicated by:   Patient Active Problem List    Diagnosis Date Noted    Diet controlled gestational diabetes mellitus (GDM), antepartum 2024     Priority: High     Overview Note:     2024 patient declines 1 hour GTT. Desires to check blood sugars four times/ day and be considered gestational diabetic. Declines consultation with diabetic educator and MFM.       BMI 40.0-44.9, adult 2024     Priority: High    H/O:  2024     Priority: High    Encounter for care or examination of mother immediately after delivery 2025    RLTCS 25 M Apg 8/9 Wt 9#11 2025         She does admit to having good home support. Her bowels are regular and she denies any urinary tract symptomology.    OB History    Para Term  AB Living   2 2 2 0 0 2   SAB IAB Ectopic Molar Multiple Live Births   0 0 0 0 0 2           Blood pressure 118/72, height 1.626 m (5' 4\"), weight 128.4 kg (283

## 2025-02-11 PROBLEM — Z98.891 H/O: C-SECTION: Status: RESOLVED | Noted: 2024-07-08 | Resolved: 2025-02-11

## 2025-02-11 PROBLEM — O24.410 DIET CONTROLLED GESTATIONAL DIABETES MELLITUS (GDM), ANTEPARTUM: Status: RESOLVED | Noted: 2024-12-31 | Resolved: 2025-02-11

## 2025-02-12 ENCOUNTER — OFFICE VISIT (OUTPATIENT)
Dept: OBGYN CLINIC | Age: 35
End: 2025-02-12
Payer: MEDICAID

## 2025-02-12 VITALS
BODY MASS INDEX: 44.9 KG/M2 | DIASTOLIC BLOOD PRESSURE: 74 MMHG | SYSTOLIC BLOOD PRESSURE: 120 MMHG | WEIGHT: 263 LBS | HEIGHT: 64 IN

## 2025-02-12 NOTE — PROGRESS NOTES
Danni Cole  4:18 PM  25            The patient was seen. She has no chief complaints today. She delivered by  section on 2025. She is  breast feeding and there is not any signs or symptoms of mastitis.  The patient completed the E.P.D.S. Evaluation form and scored 7.  She does not have any signs or symptoms of post partum depression. She denies any suicidal thoughts with a plan, intent to harm others, and delusional ideas. Today her lochia is light she denies any dizziness or shortness of breath.      Her pregnancy was complicated by:   Patient Active Problem List    Diagnosis Date Noted    Encounter for care or examination of mother immediately after delivery 2025    RLTCS 25 M Apg 8/9 Wt 9#11 2025         She does admit to having good home support. Her bowels are regular and she denies any urinary tract symptomology.    OB History    Para Term  AB Living   2 2 2 0 0 2   SAB IAB Ectopic Molar Multiple Live Births   0 0 0 0 0 2           Blood pressure 120/74, height 1.626 m (5' 4\"), weight 119.3 kg (263 lb), currently breastfeeding.    Abdomen: Soft and non-tender; good bowel sounds; no guarding, rebound or rigidity; no CVA tenderness bilaterally.    Incision: Clean, Dry and Intact without signs or symptoms of infection.    Extremities: No calf tenderness bilaterally. DTR 2/4 bilaterally. No edema.      Assessment:   Diagnosis Orders   1. Postpartum care and examination          Chief Complaint   Patient presents with    Postpartum Care     EPDS Score of 7        Plan:  1. Return to the office in  3-4 weeks  2. Signs & Symptoms of mastitis reviewed; notify if occurs  3. Secondary smoke risks reviewed. Increased risks of respiratory problems, Sudden     infant death syndrome, and potential malignancies.  4. Abstinence  5. Family planning counseling and STD counseling completed  6. Continue with post operative restrictions  7. No lifting or

## 2025-03-12 ENCOUNTER — OFFICE VISIT (OUTPATIENT)
Dept: OBGYN CLINIC | Age: 35
End: 2025-03-12
Payer: MEDICAID

## 2025-03-12 VITALS
BODY MASS INDEX: 45.93 KG/M2 | WEIGHT: 269 LBS | SYSTOLIC BLOOD PRESSURE: 124 MMHG | DIASTOLIC BLOOD PRESSURE: 82 MMHG | HEIGHT: 64 IN

## 2025-03-12 DIAGNOSIS — Z86.32 HX GESTATIONAL DIABETES: ICD-10-CM

## 2025-03-12 DIAGNOSIS — Z98.891 STATUS POST REPEAT LOW TRANSVERSE CESAREAN SECTION: ICD-10-CM

## 2025-03-12 PROCEDURE — 99213 OFFICE O/P EST LOW 20 MIN: CPT | Performed by: NURSE PRACTITIONER

## 2025-03-12 RX ORDER — FERROUS SULFATE 325(65) MG
TABLET ORAL
COMMUNITY
Start: 2025-01-29

## 2025-03-12 NOTE — PROGRESS NOTES
Danni Cole is a 34 y.o. female    Delivery Information:  Delivery Date: 2025    Sex of Baby: male  Type of Delivery:   Delivering Physician: Dr rebollar    Post Partum Questions:  No Do you Smoke?  If yes PPD is NA  Yes Do you intake caffeine?  No Do you use street drugs?  No Do you consume alcohol?  Yes Are breast feeding?  No Are you bottle feeding?  No Are you having any problems with your breasts? (lumps, discharge, asymmetry, or redness)  Yes Are you having any problems with bowel movements or urination? Having occasional hard stools  No Are you having any vaginal discharge/itching/ or burning?  Yes Are you getting help and support with the baby?  No Have you had intercourse since your delivery?  NA If you had intercourse did you use condoms?  Yes Have you had a menstrual cycle since delivery?  Date: Maybe   Yes Do you have any questions that need to be addressed today? Any other restrictions and how she should go about life in next few months.    How long did you bleed after your delivery? 4.5 Weeks  What are your plans besides condoms for family planning? None. Patient advised to avoid pregnancy for 1 year after   Who lives at home with you and your baby?   and first born son.    The patient was counseled on the risks of tobacco abuse and the harm it may cause to the patient and her  baby as well as others in the household from secondary smoke exposure.  The patient was counseled on the risks of potential respiratory problems, cancers, and sudden infant death syndrome as well as other co-morbidities. These were discussed in detail. I reviewed cessation options and plans. The patient was counseled on smoking outdoors with appropriate covers of clothing and hair.  She was counseled on hand washing prior to holding or picking up the baby.    The patient had her questions answered in regards to the baby and was instructed to follow up with her pediatrician. She

## (undated) DEVICE — 450 ML BOTTLE OF 0.05% CHLORHEXIDINE GLUCONATE IN 99.95% STERILE WATER FOR IRRIGATION, USP AND APPLICATOR.: Brand: IRRISEPT ANTIMICROBIAL WOUND LAVAGE

## (undated) DEVICE — SUTURE VICRYL SZ 2-0 L36IN ABSRB VLT L36MM CT-1 1/2 CIR J345H

## (undated) DEVICE — SOLUTION IRRIG 1000ML 0.9% SOD CHL USP POUR PLAS BTL

## (undated) DEVICE — SUTURE VICRYL SZ 4-0 L18IN ABSRB UD L19MM PS-2 3/8 CIR PRIM J496H

## (undated) DEVICE — PREVENA INCISION MANAGEMENT SYSTEM- PEEL & PLACE DRESSING: Brand: PREVENA™ PEEL & PLACE™

## (undated) DEVICE — GLOVE SURG SZ 7 THK118MIL BLK LTX FREE POLYISOPRENE BEAD CUF

## (undated) DEVICE — TRAY SPNL 24GA L4IN PENCAN PNCL PNT NDL 0.75% BIPIVCAIN W/

## (undated) DEVICE — Device

## (undated) DEVICE — SUTURE VICRYL SZ 0 L36IN ABSRB UD L36MM CT-1 1/2 CIR J946H

## (undated) DEVICE — SOLUTION IRRIG 1000ML STRL H2O USP PLAS POUR BTL

## (undated) DEVICE — TOWEL SURG W16XL26IN WHT NONFENESTRATED ST 2 PER PK

## (undated) DEVICE — KENDALL SCD EXPRESS SLEEVES, KNEE LENGTH, MEDIUM: Brand: KENDALL SCD